# Patient Record
Sex: FEMALE | Race: WHITE | Employment: PART TIME | URBAN - METROPOLITAN AREA
[De-identification: names, ages, dates, MRNs, and addresses within clinical notes are randomized per-mention and may not be internally consistent; named-entity substitution may affect disease eponyms.]

---

## 2018-03-23 RX ORDER — NAPROXEN AND ESOMEPRAZOLE MAGNESIUM 500; 20 MG/1; MG/1
1 TABLET, DELAYED RELEASE ORAL 2 TIMES DAILY
Qty: 180 TABLET | Refills: 0 | Status: SHIPPED | OUTPATIENT
Start: 2018-03-23 | End: 2019-04-18 | Stop reason: SDUPTHER

## 2018-04-09 ENCOUNTER — HOSPITAL ENCOUNTER (OUTPATIENT)
Age: 43
Discharge: HOME | End: 2018-04-11
Payer: COMMERCIAL

## 2018-04-09 DIAGNOSIS — R53.83 FATIGUE, UNSPECIFIED TYPE: ICD-10-CM

## 2018-04-09 DIAGNOSIS — E55.9 VITAMIN D DEFICIENCY: ICD-10-CM

## 2018-04-09 DIAGNOSIS — E55.9 VITAMIN D DEFICIENCY: Primary | ICD-10-CM

## 2018-04-09 LAB
BASOPHILS ABSOLUTE: 0.02 E9/L (ref 0–0.2)
BASOPHILS RELATIVE PERCENT: 0.2 % (ref 0–2)
EOSINOPHILS ABSOLUTE: 0.09 E9/L (ref 0.05–0.5)
EOSINOPHILS RELATIVE PERCENT: 1.1 % (ref 0–6)
HCT VFR BLD CALC: 40.9 % (ref 34–48)
HEMOGLOBIN: 13.4 G/DL (ref 11.5–15.5)
IMMATURE GRANULOCYTES #: 0.02 E9/L
IMMATURE GRANULOCYTES %: 0.2 % (ref 0–5)
LYMPHOCYTES ABSOLUTE: 2.83 E9/L (ref 1.5–4)
LYMPHOCYTES RELATIVE PERCENT: 34.7 % (ref 20–42)
MCH RBC QN AUTO: 28.9 PG (ref 26–35)
MCHC RBC AUTO-ENTMCNC: 32.8 % (ref 32–34.5)
MCV RBC AUTO: 88.3 FL (ref 80–99.9)
MONOCYTES ABSOLUTE: 0.55 E9/L (ref 0.1–0.95)
MONOCYTES RELATIVE PERCENT: 6.7 % (ref 2–12)
NEUTROPHILS ABSOLUTE: 4.64 E9/L (ref 1.8–7.3)
NEUTROPHILS RELATIVE PERCENT: 57.1 % (ref 43–80)
PDW BLD-RTO: 12.2 FL (ref 11.5–15)
PLATELET # BLD: 217 E9/L (ref 130–450)
PMV BLD AUTO: 11.3 FL (ref 7–12)
RBC # BLD: 4.63 E12/L (ref 3.5–5.5)
T4 FREE: 1 NG/DL (ref 0.93–1.7)
TSH SERPL DL<=0.05 MIU/L-ACNC: 2.52 UIU/ML (ref 0.27–4.2)
VITAMIN D 25-HYDROXY: 38 NG/ML (ref 30–100)
WBC # BLD: 8.2 E9/L (ref 4.5–11.5)

## 2018-04-09 PROCEDURE — 85025 COMPLETE CBC W/AUTO DIFF WBC: CPT

## 2018-04-09 PROCEDURE — 84439 ASSAY OF FREE THYROXINE: CPT

## 2018-04-09 PROCEDURE — 82306 VITAMIN D 25 HYDROXY: CPT

## 2018-04-09 PROCEDURE — 84443 ASSAY THYROID STIM HORMONE: CPT

## 2018-04-13 ENCOUNTER — OFFICE VISIT (OUTPATIENT)
Dept: FAMILY MEDICINE CLINIC | Age: 43
End: 2018-04-13
Payer: COMMERCIAL

## 2018-04-13 VITALS
SYSTOLIC BLOOD PRESSURE: 122 MMHG | HEART RATE: 77 BPM | HEIGHT: 65 IN | WEIGHT: 157 LBS | DIASTOLIC BLOOD PRESSURE: 74 MMHG | OXYGEN SATURATION: 98 % | TEMPERATURE: 98.2 F | BODY MASS INDEX: 26.16 KG/M2 | RESPIRATION RATE: 14 BRPM

## 2018-04-13 DIAGNOSIS — Z13.31 POSITIVE DEPRESSION SCREENING: ICD-10-CM

## 2018-04-13 DIAGNOSIS — Z00.00 ROUTINE GENERAL MEDICAL EXAMINATION AT A HEALTH CARE FACILITY: Primary | ICD-10-CM

## 2018-04-13 DIAGNOSIS — F32.A DEPRESSION, UNSPECIFIED DEPRESSION TYPE: ICD-10-CM

## 2018-04-13 PROCEDURE — G8431 POS CLIN DEPRES SCRN F/U DOC: HCPCS | Performed by: NURSE PRACTITIONER

## 2018-04-13 PROCEDURE — G0444 DEPRESSION SCREEN ANNUAL: HCPCS | Performed by: NURSE PRACTITIONER

## 2018-04-13 PROCEDURE — 99396 PREV VISIT EST AGE 40-64: CPT | Performed by: NURSE PRACTITIONER

## 2018-04-13 RX ORDER — BUPROPION HYDROCHLORIDE 100 MG/1
100 TABLET, EXTENDED RELEASE ORAL 2 TIMES DAILY
Qty: 60 TABLET | Refills: 0 | Status: SHIPPED | OUTPATIENT
Start: 2018-04-13 | End: 2018-05-15 | Stop reason: DRUGHIGH

## 2018-04-13 RX ORDER — ATENOLOL 25 MG/1
12.5 TABLET ORAL DAILY
COMMUNITY
End: 2020-03-25 | Stop reason: SDUPTHER

## 2018-04-13 ASSESSMENT — PATIENT HEALTH QUESTIONNAIRE - PHQ9
2. FEELING DOWN, DEPRESSED OR HOPELESS: 2
8. MOVING OR SPEAKING SO SLOWLY THAT OTHER PEOPLE COULD HAVE NOTICED. OR THE OPPOSITE, BEING SO FIGETY OR RESTLESS THAT YOU HAVE BEEN MOVING AROUND A LOT MORE THAN USUAL: 0
9. THOUGHTS THAT YOU WOULD BE BETTER OFF DEAD, OR OF HURTING YOURSELF: 0
3. TROUBLE FALLING OR STAYING ASLEEP: 1
10. IF YOU CHECKED OFF ANY PROBLEMS, HOW DIFFICULT HAVE THESE PROBLEMS MADE IT FOR YOU TO DO YOUR WORK, TAKE CARE OF THINGS AT HOME, OR GET ALONG WITH OTHER PEOPLE: 1
5. POOR APPETITE OR OVEREATING: 0
1. LITTLE INTEREST OR PLEASURE IN DOING THINGS: 0
SUM OF ALL RESPONSES TO PHQ9 QUESTIONS 1 & 2: 2
7. TROUBLE CONCENTRATING ON THINGS, SUCH AS READING THE NEWSPAPER OR WATCHING TELEVISION: 3
4. FEELING TIRED OR HAVING LITTLE ENERGY: 3
6. FEELING BAD ABOUT YOURSELF - OR THAT YOU ARE A FAILURE OR HAVE LET YOURSELF OR YOUR FAMILY DOWN: 2
SUM OF ALL RESPONSES TO PHQ QUESTIONS 1-9: 11

## 2018-05-15 RX ORDER — BUPROPION HYDROCHLORIDE 150 MG/1
150 TABLET, EXTENDED RELEASE ORAL 2 TIMES DAILY
Qty: 60 TABLET | Refills: 3 | Status: SHIPPED | OUTPATIENT
Start: 2018-05-15 | End: 2018-09-11 | Stop reason: SDUPTHER

## 2018-07-11 RX ORDER — SCOLOPAMINE TRANSDERMAL SYSTEM 1 MG/1
1 PATCH, EXTENDED RELEASE TRANSDERMAL
Qty: 4 PATCH | Refills: 2 | Status: SHIPPED
Start: 2018-07-11 | End: 2021-10-29 | Stop reason: SDUPTHER

## 2018-10-25 DIAGNOSIS — G43.109 MIGRAINE WITH AURA AND WITHOUT STATUS MIGRAINOSUS, NOT INTRACTABLE: ICD-10-CM

## 2018-10-26 RX ORDER — BUTALBITAL, ACETAMINOPHEN AND CAFFEINE 50; 325; 40 MG/1; MG/1; MG/1
1 TABLET ORAL EVERY 4 HOURS PRN
Qty: 30 TABLET | Refills: 2 | Status: SHIPPED | OUTPATIENT
Start: 2018-10-26 | End: 2019-12-05 | Stop reason: SDUPTHER

## 2018-12-18 DIAGNOSIS — N30.01 ACUTE CYSTITIS WITH HEMATURIA: Primary | ICD-10-CM

## 2018-12-18 RX ORDER — CIPROFLOXACIN 500 MG/1
500 TABLET, FILM COATED ORAL 2 TIMES DAILY
Qty: 14 TABLET | Refills: 0 | Status: SHIPPED | OUTPATIENT
Start: 2018-12-18 | End: 2018-12-25

## 2019-04-02 RX ORDER — BUPROPION HYDROCHLORIDE 200 MG/1
200 TABLET, EXTENDED RELEASE ORAL 2 TIMES DAILY
Qty: 60 TABLET | Refills: 3 | Status: SHIPPED | OUTPATIENT
Start: 2019-04-02 | End: 2019-08-16 | Stop reason: SDUPTHER

## 2019-04-18 RX ORDER — NAPROXEN AND ESOMEPRAZOLE MAGNESIUM 500; 20 MG/1; MG/1
1 TABLET, DELAYED RELEASE ORAL 2 TIMES DAILY
Qty: 180 TABLET | Refills: 0 | Status: SHIPPED
Start: 2019-04-18 | End: 2021-10-29 | Stop reason: ALTCHOICE

## 2019-08-16 RX ORDER — BUPROPION HYDROCHLORIDE 200 MG/1
200 TABLET, EXTENDED RELEASE ORAL 2 TIMES DAILY
Qty: 60 TABLET | Refills: 3 | Status: SHIPPED | OUTPATIENT
Start: 2019-08-16 | End: 2019-12-07 | Stop reason: SDUPTHER

## 2019-11-13 RX ORDER — HALCINONIDE 1 MG/G
1 CREAM TOPICAL DAILY
Qty: 60 G | Refills: 5 | Status: SHIPPED
Start: 2019-11-13 | End: 2022-03-24 | Stop reason: SDUPTHER

## 2019-12-05 DIAGNOSIS — G43.109 MIGRAINE WITH AURA AND WITHOUT STATUS MIGRAINOSUS, NOT INTRACTABLE: ICD-10-CM

## 2019-12-05 RX ORDER — BUTALBITAL, ACETAMINOPHEN AND CAFFEINE 50; 325; 40 MG/1; MG/1; MG/1
1 TABLET ORAL EVERY 4 HOURS PRN
Qty: 30 TABLET | Refills: 2 | Status: SHIPPED
Start: 2019-12-05 | End: 2021-04-05

## 2019-12-05 RX ORDER — CYCLOBENZAPRINE HCL 5 MG
5 TABLET ORAL 2 TIMES DAILY PRN
Qty: 60 TABLET | Refills: 2 | Status: SHIPPED | OUTPATIENT
Start: 2019-12-05

## 2019-12-06 RX ORDER — CEPHALEXIN 500 MG/1
500 CAPSULE ORAL 3 TIMES DAILY
Qty: 30 CAPSULE | Refills: 0 | Status: SHIPPED | OUTPATIENT
Start: 2019-12-06 | End: 2019-12-16

## 2019-12-09 RX ORDER — BUPROPION HYDROCHLORIDE 200 MG/1
TABLET, EXTENDED RELEASE ORAL
Qty: 60 TABLET | Refills: 2 | Status: SHIPPED
Start: 2019-12-09 | End: 2020-05-04

## 2020-03-25 RX ORDER — ATENOLOL 25 MG/1
12.5 TABLET ORAL DAILY
Qty: 30 TABLET | Refills: 0 | Status: SHIPPED
Start: 2020-03-25 | End: 2020-06-08

## 2020-05-05 RX ORDER — BUPROPION HYDROCHLORIDE 200 MG/1
TABLET, EXTENDED RELEASE ORAL
Qty: 60 TABLET | Refills: 3 | Status: SHIPPED
Start: 2020-05-05 | End: 2020-09-08

## 2020-05-31 ENCOUNTER — HOSPITAL ENCOUNTER (OUTPATIENT)
Dept: GENERAL RADIOLOGY | Age: 45
End: 2020-05-31
Payer: COMMERCIAL

## 2020-05-31 ENCOUNTER — HOSPITAL ENCOUNTER (OUTPATIENT)
Dept: GENERAL RADIOLOGY | Age: 45
Discharge: HOME | End: 2020-06-02
Payer: COMMERCIAL

## 2020-05-31 ENCOUNTER — HOSPITAL ENCOUNTER (OUTPATIENT)
Age: 45
Discharge: HOME | End: 2020-06-02
Payer: COMMERCIAL

## 2020-05-31 PROCEDURE — 73630 X-RAY EXAM OF FOOT: CPT

## 2020-06-02 ENCOUNTER — OFFICE VISIT (OUTPATIENT)
Dept: ORTHOPEDIC SURGERY | Age: 45
End: 2020-06-02
Payer: COMMERCIAL

## 2020-06-02 VITALS — BODY MASS INDEX: 23.66 KG/M2 | HEIGHT: 65 IN | WEIGHT: 142 LBS

## 2020-06-02 PROCEDURE — 99203 OFFICE O/P NEW LOW 30 MIN: CPT | Performed by: ORTHOPAEDIC SURGERY

## 2020-06-02 PROCEDURE — 28470 CLTX METATARSAL FX WO MNP EA: CPT | Performed by: ORTHOPAEDIC SURGERY

## 2020-06-02 NOTE — PROGRESS NOTES
Chief Complaint   Patient presents with    Foot Pain     left foot fx on 5/30/2019. Twisted foot on tile stepping over dog gate. Jono Noland is a 50 y.o.   female who presents today  for evaluation of left foot pain. she reports this has been ongoing for the past 3 days. she does remember a specific injury that started the pain. She twisted foot on tile stepping over dog gate. she reports the pain is located over base of 5th metatarsal, and is worse with ambulation and better with rest.      Past Medical History:   Diagnosis Date    Eczema     Headache     Migraine     Motion sickness      Past Surgical History:   Procedure Laterality Date    LYMPH NODE BIOPSY         Current Outpatient Medications:     buPROPion (WELLBUTRIN SR) 200 MG extended release tablet, TAKE ONE TABLET BY MOUTH TWO TIMES A DAY, Disp: 60 tablet, Rfl: 3    atenolol (TENORMIN) 25 MG tablet, Take 0.5 tablets by mouth daily, Disp: 30 tablet, Rfl: 0    butalbital-acetaminophen-caffeine (FIORICET, ESGIC) -40 MG per tablet, Take 1 tablet by mouth every 4 hours as needed for Headaches or Migraine, Disp: 30 tablet, Rfl: 2    cyclobenzaprine (FLEXERIL) 5 MG tablet, Take 1 tablet by mouth 2 times daily as needed for Muscle spasms, Disp: 60 tablet, Rfl: 2    Crisaborole (EUCRISA) 2 % OINT, Apply 1 applicator topically 2 times daily, Disp: 60 g, Rfl: 5    halcinonide (HALOG) 0.1 % CREA, Apply 1 Tube topically daily, Disp: 60 g, Rfl: 5    VIMOVO 500-20 MG TBEC, Take 1 tablet by mouth 2 times daily, Disp: 180 tablet, Rfl: 0    scopolamine (TRANSDERM-SCOP) transdermal patch, Place 1 patch onto the skin every 72 hours, Disp: 4 patch, Rfl: 2    mupirocin (BACTROBAN) 2 % ointment, Apply topically 2 times daily. , Disp: 1 Tube, Rfl: 0    EPINEPHrine (EPIPEN 2-HENRRY) 0.3 MG/0.3ML SOAJ injection, Inject 0.3 mLs into the muscle once for 1 dose Use as directed for allergic reaction, Disp: 0.3 mL, Rfl: 2  Allergies   Allergen

## 2020-06-08 ENCOUNTER — OFFICE VISIT (OUTPATIENT)
Dept: ORTHOPEDIC SURGERY | Age: 45
End: 2020-06-08

## 2020-06-08 VITALS — BODY MASS INDEX: 23.66 KG/M2 | HEIGHT: 65 IN | WEIGHT: 142 LBS

## 2020-06-08 PROCEDURE — 99024 POSTOP FOLLOW-UP VISIT: CPT | Performed by: ORTHOPAEDIC SURGERY

## 2020-06-08 RX ORDER — ATENOLOL 25 MG/1
TABLET ORAL
Qty: 30 TABLET | Refills: 2 | Status: SHIPPED
Start: 2020-06-08 | End: 2020-12-14 | Stop reason: SDUPTHER

## 2020-06-08 NOTE — PROGRESS NOTES
antalgic; examination of the nails and digits reveal no cyanosis or clubbing. Ankle Exam:    Upon inspection and palpation of the Left ankle,  there is not deformity noted,  no swelling, no ecchymosis, does not have pain on palpation of ankle. ROM R/L : DF 15/15; PF 35/35;  INV 15/15, FER 15/15. This exam was compared bilaterally. Right Ankle:   (-) Anterior Drawer ,  (-) Posterior Drawer ,  (-) Squeeze test,  (-) External Rotation, (-) Eversion test , (-) Lebron Test     Left ankle:   (-) Anterior Drawer ,(-)  Posterior Drawer ,(-) Squeeze test,(-) External Rotation (-) Eversion test, (-) Lebron Test.      Foot exam- visual inspection reveals warm, good capillary refill, there is  pain to palpation over the base of 5th metatarsal.   ROM inversion/eversion diminished range of motion, abduction/adduction diminished range of motion, ROM in MTP/PIP/DIP diminished range of motion. Xrays:  Stable transverse fracture proximal 5th metatarsal  Radiographic findings reviewed with patient    Impression:  Encounter Diagnosis   Name Primary?  Closed nondisplaced fracture of fifth metatarsal bone of left foot, initial encounter Yes     Plan:Natural history and expected course discussed. Questions answered. Rest, ice, compression, elevation (RICE) therapy. Crutches and instructions provided. Educational materials distributed.    Protected weight bearing  CT to determine fracture zone  And future weight bearing status- will call patient with results  Crutches  Cam walker boot, ok for ROM  Fu in 4 weeks with xr

## 2020-07-08 ENCOUNTER — OFFICE VISIT (OUTPATIENT)
Dept: ORTHOPEDIC SURGERY | Age: 45
End: 2020-07-08

## 2020-07-08 VITALS — BODY MASS INDEX: 23.65 KG/M2 | WEIGHT: 141.98 LBS | HEIGHT: 65 IN

## 2020-07-08 PROCEDURE — 99024 POSTOP FOLLOW-UP VISIT: CPT | Performed by: NURSE PRACTITIONER

## 2020-09-08 RX ORDER — BUPROPION HYDROCHLORIDE 200 MG/1
TABLET, EXTENDED RELEASE ORAL
Qty: 60 TABLET | Refills: 3 | Status: SHIPPED
Start: 2020-09-08 | End: 2021-02-09

## 2020-09-14 ENCOUNTER — OFFICE VISIT (OUTPATIENT)
Dept: ORTHOPEDIC SURGERY | Age: 45
End: 2020-09-14
Payer: COMMERCIAL

## 2020-09-14 VITALS — TEMPERATURE: 98 F | BODY MASS INDEX: 23.32 KG/M2 | WEIGHT: 140 LBS | HEIGHT: 65 IN

## 2020-09-14 PROCEDURE — 99212 OFFICE O/P EST SF 10 MIN: CPT | Performed by: NURSE PRACTITIONER

## 2020-09-14 NOTE — PROGRESS NOTES
Chief Complaint   Patient presents with    Foot Pain     Left Foot FX, F/U DOI 5/30/2020       Susanne Skiff is a 50 y.o.   female who presents today  for evaluation of left foot fracture. She is back to normal activity and wbat in regular shoe. Past Medical History:   Diagnosis Date    Eczema     Headache     Migraine     Motion sickness      Past Surgical History:   Procedure Laterality Date    LYMPH NODE BIOPSY         Current Outpatient Medications:     buPROPion (WELLBUTRIN SR) 200 MG extended release tablet, TAKE ONE TABLET BY MOUTH TWO TIMES A DAY, Disp: 60 tablet, Rfl: 3    atenolol (TENORMIN) 25 MG tablet, TAKE ONE-HALF TABLET BY MOUTH DAILY, Disp: 30 tablet, Rfl: 2    butalbital-acetaminophen-caffeine (FIORICET, ESGIC) -40 MG per tablet, Take 1 tablet by mouth every 4 hours as needed for Headaches or Migraine, Disp: 30 tablet, Rfl: 2    cyclobenzaprine (FLEXERIL) 5 MG tablet, Take 1 tablet by mouth 2 times daily as needed for Muscle spasms, Disp: 60 tablet, Rfl: 2    Crisaborole (EUCRISA) 2 % OINT, Apply 1 applicator topically 2 times daily, Disp: 60 g, Rfl: 5    halcinonide (HALOG) 0.1 % CREA, Apply 1 Tube topically daily, Disp: 60 g, Rfl: 5    VIMOVO 500-20 MG TBEC, Take 1 tablet by mouth 2 times daily, Disp: 180 tablet, Rfl: 0    scopolamine (TRANSDERM-SCOP) transdermal patch, Place 1 patch onto the skin every 72 hours, Disp: 4 patch, Rfl: 2    mupirocin (BACTROBAN) 2 % ointment, Apply topically 2 times daily. , Disp: 1 Tube, Rfl: 0    EPINEPHrine (EPIPEN 2-HENRRY) 0.3 MG/0.3ML SOAJ injection, Inject 0.3 mLs into the muscle once for 1 dose Use as directed for allergic reaction, Disp: 0.3 mL, Rfl: 2  Allergies   Allergen Reactions    Bactrim [Sulfamethoxazole-Trimethoprim] Hives    Bee Venom Anaphylaxis    Sulfa Antibiotics Hives     Social History     Socioeconomic History    Marital status:      Spouse name: Not on file    Number of children: Not on file    sweats. GI: (-) nausea, (-) vomiting, (-) diarrhea, (-) blood in stool, (-) gastric ulcer. Psychiatric: (-) Depression, (-) Anxiety, (-) bipolar disease, (-) Alzheimer's Disease  Allergic/Immunologic: (-) allergies latex, (-) allergies metal, (-) skin sensitivity. Hematlogic: (-) anemia, (-) blood transfusion, (-) DVT/PE, (-) Clotting disorders      EXAM:      Constitution:    Temp 98 °F (36.7 °C)   Ht 5' 5\" (1.651 m)   Wt 140 lb (63.5 kg)   BMI 23.30 kg/m²     Psycihatric:    The patient is alert and oriented x 3, appears to be stated age and in no distress. Respiratory:    Respiratory effort is not labored. Patient is not gasping. Palpation of the chest reveals no tactile fremitus. Skin:    Upon inspection: the skin appears warm, dry and intact. There is not a previous scar over the affected area. There is not any cellulitis, lymphedema or cutaneous lesions noted in the lower extremities. Upon palpation there is no induration noted. Neurologic:      Motor exam of the lower extremities show ; quadriceps, hamstrings, foot dorsi and plantar flexors intact R.  5/5 and L. 5/5. Deep tendon reflexes are 2/4 at the knees and 2/4 at the ankles with strong extensor hallicus longus motor strength bilaterally. Sensory to both feet is intact to all sensory roots. Cardiovascular: The vascular exam is normal and is well perfused to distal extremities. Distal pulses DP/PT: R. 2+; L. 2+. There is cap refill noted less than two seconds in all digits. There is not edema of the bilateral lower extremities. There is not varicosities noted in the distal extremities. Lymph:    Upon palpation,  there is no lymphadenopathy noted in bilateral lower extremities. Musculoskeletal:    Gait: antalgic; examination of the nails and digits reveal no cyanosis or clubbing.         Ankle Exam:    Upon inspection and palpation of the Left ankle,  there is not deformity noted,  no swelling, no ecchymosis,

## 2020-09-24 ENCOUNTER — OFFICE VISIT (OUTPATIENT)
Dept: ORTHOPEDIC SURGERY | Age: 45
End: 2020-09-24
Payer: OTHER GOVERNMENT

## 2020-09-24 VITALS — BODY MASS INDEX: 23.32 KG/M2 | WEIGHT: 140 LBS | HEIGHT: 65 IN

## 2020-09-24 PROCEDURE — 99213 OFFICE O/P EST LOW 20 MIN: CPT | Performed by: ORTHOPAEDIC SURGERY

## 2020-09-24 NOTE — PROGRESS NOTES
Chief Complaint   Patient presents with    Foot Pain     Right foot injury 9/18/2020. Patient was stepped on by her horse. Patient wearing cast boot and limited weight bearing with crutches. Syed Luciano is a 50 y.o. female who presents today with a right foot injury. The injury occurred 9/18/2020. The history of injury was from direct trauma when her horse was spooked and essentially struck her foot with his hoof. The patient complains of pain over lateral mid foot. The intensity is 2/10. The pain is described as: dull. Previous treatment includes:ice  elevation  Short cast boot with good relief. Past Medical History:   Diagnosis Date    Eczema     Headache     Migraine     Motion sickness      Past Surgical History:   Procedure Laterality Date    LYMPH NODE BIOPSY         Current Outpatient Medications:     buPROPion (WELLBUTRIN SR) 200 MG extended release tablet, TAKE ONE TABLET BY MOUTH TWO TIMES A DAY, Disp: 60 tablet, Rfl: 3    atenolol (TENORMIN) 25 MG tablet, TAKE ONE-HALF TABLET BY MOUTH DAILY, Disp: 30 tablet, Rfl: 2    butalbital-acetaminophen-caffeine (FIORICET, ESGIC) -40 MG per tablet, Take 1 tablet by mouth every 4 hours as needed for Headaches or Migraine, Disp: 30 tablet, Rfl: 2    cyclobenzaprine (FLEXERIL) 5 MG tablet, Take 1 tablet by mouth 2 times daily as needed for Muscle spasms, Disp: 60 tablet, Rfl: 2    Crisaborole (EUCRISA) 2 % OINT, Apply 1 applicator topically 2 times daily, Disp: 60 g, Rfl: 5    halcinonide (HALOG) 0.1 % CREA, Apply 1 Tube topically daily, Disp: 60 g, Rfl: 5    VIMOVO 500-20 MG TBEC, Take 1 tablet by mouth 2 times daily, Disp: 180 tablet, Rfl: 0    scopolamine (TRANSDERM-SCOP) transdermal patch, Place 1 patch onto the skin every 72 hours, Disp: 4 patch, Rfl: 2    mupirocin (BACTROBAN) 2 % ointment, Apply topically 2 times daily. , Disp: 1 Tube, Rfl: 0    EPINEPHrine (EPIPEN 2-HENRRY) 0.3 MG/0.3ML SOAJ injection, Inject 0.3 mLs into the muscle once for 1 dose Use as directed for allergic reaction, Disp: 0.3 mL, Rfl: 2  Allergies   Allergen Reactions    Bactrim [Sulfamethoxazole-Trimethoprim] Hives    Bee Venom Anaphylaxis    Sulfa Antibiotics Hives     Social History     Socioeconomic History    Marital status:      Spouse name: Not on file    Number of children: Not on file    Years of education: Not on file    Highest education level: Not on file   Occupational History    Not on file   Social Needs    Financial resource strain: Not on file    Food insecurity     Worry: Not on file     Inability: Not on file    Transportation needs     Medical: Not on file     Non-medical: Not on file   Tobacco Use    Smoking status: Never Smoker    Smokeless tobacco: Never Used   Substance and Sexual Activity    Alcohol use: Yes     Comment: rarely    Drug use: No    Sexual activity: Not on file   Lifestyle    Physical activity     Days per week: Not on file     Minutes per session: Not on file    Stress: Not on file   Relationships    Social connections     Talks on phone: Not on file     Gets together: Not on file     Attends Sikh service: Not on file     Active member of club or organization: Not on file     Attends meetings of clubs or organizations: Not on file     Relationship status: Not on file    Intimate partner violence     Fear of current or ex partner: Not on file     Emotionally abused: Not on file     Physically abused: Not on file     Forced sexual activity: Not on file   Other Topics Concern    Not on file   Social History Narrative    Not on file     Family History   Problem Relation Age of Onset    Cancer Father        REVIEW OF SYSTEMS:     General/Constitution:  (-)weight loss, (-)fever, (-)chills, (-)weakness. Skin: (-) rash,(-) psoriasis,(-) eczema, (-)skin cancer.    Musculoskeletal: (-) fractures,  (-) dislocations,(-) collagen vascular disease, (-) fibromyalgia, (-) multiple sclerosis, (-) muscular dystrophy, (-) RSD,(-) joint pain (-)swelling, (-) joint pain,swelling. Neurologic: (-) epilepsy, (-)seizures,(-) brain tumor,(-) TIA, (-)stroke, (-)headaches, (-)Parkinson disease,(-) memory loss, (-) LOC. Cardiovascular: (-) Chest pain, (-) swelling in legs/feet, (-) SOB, (-) cramping in legs/feet with walking. Respiratory: (-) SOB, (-) Coughing, (-) night sweats. GI: (-) nausea, (-) vomiting, (-) diarrhea, (-) blood in stool, (-) gastric ulcer. Psychiatric: (-) Depression, (-) Anxiety, (-) bipolar disease, (-) Alzheimer's Disease  Allergic/Immunologic: (-) allergies latex, (-) allergies metal, (-) skin sensitivity. Hematlogic: (-) anemia, (-) blood transfusion, (-) DVT/PE, (-) Clotting disorders      EXAM:      Constitution:  There were no vitals filed for this visit. Psycihatric:    The patient is alert and oriented x 3, appears to be stated age and in no distress. Respiratory:    Respiratory effort is not labored. Patient is not gasping. Palpation of the chest reveals no tactile fremitus. Skin:    Upon inspection: the skin appears warm, dry and intact. There is not a previous scar over the affected area. There is not any cellulitis, lymphedema or cutaneous lesions noted in the lower extremities. Upon palpation there is no induration noted. Neurologic:      Motor exam of the lower extremities show ; quadriceps, hamstrings, foot dorsi and plantar flexors intact R.  5/5 and L. 5/5. Deep tendon reflexes are 2/4 at the knees and 2/4 at the ankles with strong extensor hallicus longus motor strength bilaterally. Sensory to both feet is intact to all sensory roots. Cardiovascular: The vascular exam is normal and is well perfused to distal extremities. Distal pulses DP/PT: R. 2+; L. 2+. There is cap refill noted less than two seconds in all digits. There is not edema of the bilateral lower extremities. There is not varicosities noted in the distal extremities.       Lymph:    Upon palpation,  there is no lymphadenopathy noted in bilateral lower extremities. Musculoskeletal:    Gait: antalgic; examination of the nails and digits reveal no cyanosis or clubbing. Knee exam - bilateral knee exam shows;  range of motion of R. Knee is 0 to 140, and L. Knee is 0 to 140. The patient does not have  pain on motion, there is not an effusion, there is not tenderness over the  global region, there are not any masses, there is not ligamentous instability, there is not  deformity noted. Knee exam: the injured knee reveals normal exam, no swelling, tenderness, instability; ligaments intact, FROM. Ankle Exam:    Upon inspection and palpation of the Right ankle,  there is not deformity noted,  moderate swelling, moderate ecchymosis, has pain on palpation of right lateral midfoot. ROM R: Limited Secondary to pain; Left ankle : DF20; PF 40;  INV 30, FER 10. This exam was compared bilaterally. Right Ankle:   (-) Anterior Drawer ,  (-) Posterior Drawer ,  (-) Squeeze test,  (-) External Rotation, (-) Eversion test , (-) Lebron Test     Left ankle:   (-) Anterior Drawer ,(-)  Posterior Drawer ,(-) Squeeze test,(-) External Rotation (-) Eversion test, (-) Lebron Test.      Foot exam- visual inspection reveals warm, good capillary refill, there is not pain to palpation over the metatarsals. ROM inversion/eversion full range of motion, abduction/adduction full range of motion, ROM in MTP/PIP/DIP full range of motion. Xrays:    Xr Foot Left (min 3 Views)    Result Date: 9/14/2020  EXAMINATION: THREE XRAY VIEWS OF THE LEFT FOOT 9/14/2020 9:23 am COMPARISON: 07/08/2020 HISTORY: ORDERING SYSTEM PROVIDED HISTORY: Closed nondisplaced fracture of fifth metatarsal bone of left foot, initial encounter FINDINGS: There has been continued interval healing of the fracture through the base of the 5th metatarsal.  A vague fracture lucency is persistent. Anatomic alignment is maintained.   The 1st through 4th digits are unremarkable. 1. Continued interval healing of the fracture through the base of the 5th metatarsal.     Xr Foot Right (min 3 Views)    Result Date: 9/23/2020  EXAMINATION: THREE XRAY VIEWS OF THE RIGHT FOOT 9/23/2020 1:04 pm COMPARISON: None. HISTORY: ORDERING SYSTEM PROVIDED HISTORY: Right foot pain TECHNOLOGIST PROVIDED HISTORY: Reason for exam:->pain after injury Trauma 5 days ago, pain 5th digit FINDINGS: There is a tiny bony density adjacent to the plantar lateral aspect of the base of the cuboid bone, consistent with a fracture. Its age is indeterminate. Otherwise, no acute fracture or dislocation is seen. There is mild degenerative change about the 1st MTP joint with a tiny bony density seen medially that could be related to a loose body or old fracture. There is a tiny plantar calcaneal spur. No erosive changes are seen and the visualized soft tissues are unremarkable. 1. Tiny fracture at the plantar lateral aspect of the base of the cuboid bone, of indeterminate age. Clinical correlation is suggested. 2. Mild degenerative change at the 1st MTP joint. Tiny plantar calcaneal spur. Radiographic findings reviewed with patient      Impression:  No Christian was seen today for foot pain. Diagnoses and all orders for this visit:    Closed nondisplaced fracture of cuboid of right foot, initial encounter  -     CT FOOT RIGHT WO CONTRAST; Future        Patient seen and examined. X-rays reviewed. Natural history and course discussed with patient. Treatment options discussed with patient in detail including risks and benefits. Patient should do well with conservative management at this time. Plan:Natural history and expected course discussed. Questions answered. Rest, ice, compression, elevation (RICE) therapy. Crutches and instructions provided. Educational materials distributed. Fit with short leg cast boot for use over next 2-4 weeks.   Home exercise plan outlined. OTC analgesics as needed.   PT referral.  F/u prn if no better in 4-6 weeks

## 2020-10-07 ENCOUNTER — OFFICE VISIT (OUTPATIENT)
Dept: ORTHOPEDIC SURGERY | Age: 45
End: 2020-10-07
Payer: OTHER GOVERNMENT

## 2020-10-07 VITALS — BODY MASS INDEX: 23.32 KG/M2 | WEIGHT: 140 LBS | HEIGHT: 65 IN

## 2020-10-07 PROCEDURE — 99212 OFFICE O/P EST SF 10 MIN: CPT | Performed by: ORTHOPAEDIC SURGERY

## 2020-10-07 NOTE — PROGRESS NOTES
Chief Complaint   Patient presents with    Foot Pain     Right foot fx fu. DOI 9/18/2020. Deanna Durán is a 50 y.o. female who presents today with a right foot injury. The injury occurred 9/18/2020. The history of injury was from direct trauma when her horse was spooked and essentially struck her foot with his hoof. The patient complains of pain over lateral mid foot. The intensity is 2/10. The pain is described as: dull. Previous treatment includes:ice  elevation  Short cast boot with good relief. Past Medical History:   Diagnosis Date    Eczema     Headache     Migraine     Motion sickness      Past Surgical History:   Procedure Laterality Date    LYMPH NODE BIOPSY         Current Outpatient Medications:     buPROPion (WELLBUTRIN SR) 200 MG extended release tablet, TAKE ONE TABLET BY MOUTH TWO TIMES A DAY, Disp: 60 tablet, Rfl: 3    atenolol (TENORMIN) 25 MG tablet, TAKE ONE-HALF TABLET BY MOUTH DAILY, Disp: 30 tablet, Rfl: 2    butalbital-acetaminophen-caffeine (FIORICET, ESGIC) -40 MG per tablet, Take 1 tablet by mouth every 4 hours as needed for Headaches or Migraine, Disp: 30 tablet, Rfl: 2    cyclobenzaprine (FLEXERIL) 5 MG tablet, Take 1 tablet by mouth 2 times daily as needed for Muscle spasms, Disp: 60 tablet, Rfl: 2    Crisaborole (EUCRISA) 2 % OINT, Apply 1 applicator topically 2 times daily, Disp: 60 g, Rfl: 5    halcinonide (HALOG) 0.1 % CREA, Apply 1 Tube topically daily, Disp: 60 g, Rfl: 5    VIMOVO 500-20 MG TBEC, Take 1 tablet by mouth 2 times daily, Disp: 180 tablet, Rfl: 0    scopolamine (TRANSDERM-SCOP) transdermal patch, Place 1 patch onto the skin every 72 hours, Disp: 4 patch, Rfl: 2    mupirocin (BACTROBAN) 2 % ointment, Apply topically 2 times daily. , Disp: 1 Tube, Rfl: 0    EPINEPHrine (EPIPEN 2-HENRRY) 0.3 MG/0.3ML SOAJ injection, Inject 0.3 mLs into the muscle once for 1 dose Use as directed for allergic reaction, Disp: 0.3 mL, Rfl: 2  Allergies Allergen Reactions    Bactrim [Sulfamethoxazole-Trimethoprim] Hives    Bee Venom Anaphylaxis    Sulfa Antibiotics Hives     Social History     Socioeconomic History    Marital status:      Spouse name: Not on file    Number of children: Not on file    Years of education: Not on file    Highest education level: Not on file   Occupational History    Not on file   Social Needs    Financial resource strain: Not on file    Food insecurity     Worry: Not on file     Inability: Not on file    Transportation needs     Medical: Not on file     Non-medical: Not on file   Tobacco Use    Smoking status: Never Smoker    Smokeless tobacco: Never Used   Substance and Sexual Activity    Alcohol use: Yes     Comment: rarely    Drug use: No    Sexual activity: Not on file   Lifestyle    Physical activity     Days per week: Not on file     Minutes per session: Not on file    Stress: Not on file   Relationships    Social connections     Talks on phone: Not on file     Gets together: Not on file     Attends Mu-ism service: Not on file     Active member of club or organization: Not on file     Attends meetings of clubs or organizations: Not on file     Relationship status: Not on file    Intimate partner violence     Fear of current or ex partner: Not on file     Emotionally abused: Not on file     Physically abused: Not on file     Forced sexual activity: Not on file   Other Topics Concern    Not on file   Social History Narrative    Not on file     Family History   Problem Relation Age of Onset    Cancer Father        REVIEW OF SYSTEMS:     General/Constitution:  (-)weight loss, (-)fever, (-)chills, (-)weakness. Skin: (-) rash,(-) psoriasis,(-) eczema, (-)skin cancer. Musculoskeletal: (-) fractures,  (-) dislocations,(-) collagen vascular disease, (-) fibromyalgia, (-) multiple sclerosis, (-) muscular dystrophy, (-) RSD,(-) joint pain (-)swelling, (-) joint pain,swelling.   Neurologic: (-) epilepsy, (-)seizures,(-) brain tumor,(-) TIA, (-)stroke, (-)headaches, (-)Parkinson disease,(-) memory loss, (-) LOC. Cardiovascular: (-) Chest pain, (-) swelling in legs/feet, (-) SOB, (-) cramping in legs/feet with walking. Respiratory: (-) SOB, (-) Coughing, (-) night sweats. GI: (-) nausea, (-) vomiting, (-) diarrhea, (-) blood in stool, (-) gastric ulcer. Psychiatric: (-) Depression, (-) Anxiety, (-) bipolar disease, (-) Alzheimer's Disease  Allergic/Immunologic: (-) allergies latex, (-) allergies metal, (-) skin sensitivity. Hematlogic: (-) anemia, (-) blood transfusion, (-) DVT/PE, (-) Clotting disorders      EXAM:      Constitution:  There were no vitals filed for this visit. Psycihatric:    The patient is alert and oriented x 3, appears to be stated age and in no distress. Respiratory:    Respiratory effort is not labored. Patient is not gasping. Palpation of the chest reveals no tactile fremitus. Skin:    Upon inspection: the skin appears warm, dry and intact. There is not a previous scar over the affected area. There is not any cellulitis, lymphedema or cutaneous lesions noted in the lower extremities. Upon palpation there is no induration noted. Neurologic:      Motor exam of the lower extremities show ; quadriceps, hamstrings, foot dorsi and plantar flexors intact R.  5/5 and L. 5/5. Deep tendon reflexes are 2/4 at the knees and 2/4 at the ankles with strong extensor hallicus longus motor strength bilaterally. Sensory to both feet is intact to all sensory roots. Cardiovascular: The vascular exam is normal and is well perfused to distal extremities. Distal pulses DP/PT: R. 2+; L. 2+. There is cap refill noted less than two seconds in all digits. There is not edema of the bilateral lower extremities. There is not varicosities noted in the distal extremities. Lymph:    Upon palpation,  there is no lymphadenopathy noted in bilateral lower extremities. Musculoskeletal:    Gait: antalgic; examination of the nails and digits reveal no cyanosis or clubbing. Knee exam - bilateral knee exam shows;  range of motion of R. Knee is 0 to 140, and L. Knee is 0 to 140. The patient does not have  pain on motion, there is not an effusion, there is not tenderness over the  global region, there are not any masses, there is not ligamentous instability, there is not  deformity noted. Knee exam: the injured knee reveals normal exam, no swelling, tenderness, instability; ligaments intact, FROM. Ankle Exam:    Upon inspection and palpation of the Right ankle,  there is not deformity noted,  moderate swelling, moderate ecchymosis, has pain on palpation of right lateral midfoot. ROM R: Limited Secondary to pain; Left ankle : DF20; PF 40;  INV 30, FER 10. This exam was compared bilaterally. Right Ankle:   (-) Anterior Drawer ,  (-) Posterior Drawer ,  (-) Squeeze test,  (-) External Rotation, (-) Eversion test , (-) Lebron Test     Left ankle:   (-) Anterior Drawer ,(-)  Posterior Drawer ,(-) Squeeze test,(-) External Rotation (-) Eversion test, (-) Lebron Test.      Foot exam- visual inspection reveals warm, good capillary refill, there is not pain to palpation over the metatarsals. ROM inversion/eversion full range of motion, abduction/adduction full range of motion, ROM in MTP/PIP/DIP full range of motion. Xrays:    Xr Foot Left (min 3 Views)    Result Date: 9/14/2020  EXAMINATION: THREE XRAY VIEWS OF THE LEFT FOOT 9/14/2020 9:23 am COMPARISON: 07/08/2020 HISTORY: ORDERING SYSTEM PROVIDED HISTORY: Closed nondisplaced fracture of fifth metatarsal bone of left foot, initial encounter FINDINGS: There has been continued interval healing of the fracture through the base of the 5th metatarsal.  A vague fracture lucency is persistent. Anatomic alignment is maintained. The 1st through 4th digits are unremarkable.      1. Continued interval healing of the cuboid of right foot, initial encounter        Patient seen and examined. X-rays reviewed. Natural history and course discussed with patient. Treatment options discussed with patient in detail including risks and benefits. Patient should do well with conservative management at this time. Plan:Natural history and expected course discussed. Questions answered. Rest, ice, compression, elevation (RICE) therapy. Crutches and instructions provided. Educational materials distributed. Fit with short leg cast boot for use over next 2-4 weeks. Home exercise plan outlined. OTC analgesics as needed.   PT referral.  F/u prn if no better in 4-6 weeks

## 2020-12-14 RX ORDER — ATENOLOL 25 MG/1
TABLET ORAL
Qty: 30 TABLET | Refills: 2 | Status: SHIPPED
Start: 2020-12-14 | End: 2021-06-07

## 2021-02-09 RX ORDER — BUPROPION HYDROCHLORIDE 200 MG/1
TABLET, EXTENDED RELEASE ORAL
Qty: 60 TABLET | Refills: 0 | Status: SHIPPED
Start: 2021-02-09 | End: 2021-04-05

## 2021-04-02 DIAGNOSIS — G43.109 MIGRAINE WITH AURA AND WITHOUT STATUS MIGRAINOSUS, NOT INTRACTABLE: ICD-10-CM

## 2021-04-05 RX ORDER — BUPROPION HYDROCHLORIDE 200 MG/1
TABLET, EXTENDED RELEASE ORAL
Qty: 60 TABLET | Refills: 0 | Status: SHIPPED
Start: 2021-04-05 | End: 2021-06-07

## 2021-04-05 RX ORDER — BUTALBITAL, ACETAMINOPHEN AND CAFFEINE 50; 325; 40 MG/1; MG/1; MG/1
TABLET ORAL
Qty: 30 TABLET | Refills: 0 | Status: SHIPPED
Start: 2021-04-05 | End: 2021-07-09 | Stop reason: SDUPTHER

## 2021-06-07 RX ORDER — BUPROPION HYDROCHLORIDE 200 MG/1
TABLET, EXTENDED RELEASE ORAL
Qty: 60 TABLET | Refills: 0 | Status: SHIPPED
Start: 2021-06-07 | End: 2021-07-14

## 2021-06-07 RX ORDER — ATENOLOL 25 MG/1
TABLET ORAL
Qty: 30 TABLET | Refills: 0 | Status: SHIPPED
Start: 2021-06-07 | End: 2021-09-01

## 2021-07-09 DIAGNOSIS — G43.109 MIGRAINE WITH AURA AND WITHOUT STATUS MIGRAINOSUS, NOT INTRACTABLE: ICD-10-CM

## 2021-07-09 RX ORDER — BUTALBITAL, ACETAMINOPHEN AND CAFFEINE 50; 325; 40 MG/1; MG/1; MG/1
TABLET ORAL
Qty: 30 TABLET | Refills: 0 | Status: SHIPPED
Start: 2021-07-09 | End: 2022-02-17 | Stop reason: SDUPTHER

## 2021-07-14 RX ORDER — BUPROPION HYDROCHLORIDE 200 MG/1
TABLET, EXTENDED RELEASE ORAL
Qty: 60 TABLET | Refills: 0 | Status: SHIPPED
Start: 2021-07-14 | End: 2021-09-01

## 2021-08-06 DIAGNOSIS — Z20.822 CLOSE EXPOSURE TO COVID-19 VIRUS: Primary | ICD-10-CM

## 2021-09-01 RX ORDER — ATENOLOL 25 MG/1
TABLET ORAL
Qty: 30 TABLET | Refills: 0 | Status: SHIPPED
Start: 2021-09-01 | End: 2021-10-29 | Stop reason: SDUPTHER

## 2021-09-01 RX ORDER — BUPROPION HYDROCHLORIDE 200 MG/1
TABLET, EXTENDED RELEASE ORAL
Qty: 60 TABLET | Refills: 0 | Status: SHIPPED
Start: 2021-09-01 | End: 2021-10-25

## 2021-09-14 DIAGNOSIS — Z20.822 CLOSE EXPOSURE TO COVID-19 VIRUS: Primary | ICD-10-CM

## 2021-09-16 LAB
SARS-COV-2: NOT DETECTED
SOURCE: NORMAL

## 2021-10-25 RX ORDER — BUPROPION HYDROCHLORIDE 200 MG/1
TABLET, EXTENDED RELEASE ORAL
Qty: 60 TABLET | Refills: 0 | Status: SHIPPED
Start: 2021-10-25 | End: 2021-10-29 | Stop reason: SDUPTHER

## 2021-10-26 ENCOUNTER — IMMUNIZATION (OUTPATIENT)
Dept: PRIMARY CARE CLINIC | Age: 46
End: 2021-10-26
Payer: COMMERCIAL

## 2021-10-26 PROCEDURE — 91301 COVID-19, MODERNA VACCINE 100MCG/0.5ML DOSE: CPT | Performed by: NURSE PRACTITIONER

## 2021-10-26 PROCEDURE — 0013A COVID-19, MODERNA VACCINE 100MCG/0.5ML DOSE: CPT | Performed by: NURSE PRACTITIONER

## 2021-10-29 ENCOUNTER — TELEPHONE (OUTPATIENT)
Dept: FAMILY MEDICINE CLINIC | Age: 46
End: 2021-10-29

## 2021-10-29 ENCOUNTER — OFFICE VISIT (OUTPATIENT)
Dept: FAMILY MEDICINE CLINIC | Age: 46
End: 2021-10-29
Payer: COMMERCIAL

## 2021-10-29 VITALS
DIASTOLIC BLOOD PRESSURE: 66 MMHG | SYSTOLIC BLOOD PRESSURE: 112 MMHG | OXYGEN SATURATION: 98 % | HEART RATE: 85 BPM | TEMPERATURE: 97.3 F | RESPIRATION RATE: 16 BRPM | WEIGHT: 150.6 LBS | BODY MASS INDEX: 25.09 KG/M2 | HEIGHT: 65 IN

## 2021-10-29 DIAGNOSIS — Z87.898 H/O MOTION SICKNESS: ICD-10-CM

## 2021-10-29 DIAGNOSIS — F41.9 ANXIETY AND DEPRESSION: ICD-10-CM

## 2021-10-29 DIAGNOSIS — Z00.00 ENCOUNTER FOR WELL ADULT EXAM WITHOUT ABNORMAL FINDINGS: Primary | ICD-10-CM

## 2021-10-29 DIAGNOSIS — R00.2 PALPITATIONS: ICD-10-CM

## 2021-10-29 DIAGNOSIS — F32.A ANXIETY AND DEPRESSION: ICD-10-CM

## 2021-10-29 PROCEDURE — 99396 PREV VISIT EST AGE 40-64: CPT | Performed by: NURSE PRACTITIONER

## 2021-10-29 RX ORDER — ATENOLOL 25 MG/1
12.5 TABLET ORAL DAILY
Qty: 45 TABLET | Refills: 0 | Status: SHIPPED
Start: 2021-10-29 | End: 2022-03-24 | Stop reason: SDUPTHER

## 2021-10-29 RX ORDER — BUPROPION HYDROCHLORIDE 200 MG/1
TABLET, EXTENDED RELEASE ORAL
Qty: 180 TABLET | Refills: 0 | Status: SHIPPED
Start: 2021-10-29 | End: 2022-04-07

## 2021-10-29 RX ORDER — ATENOLOL 25 MG/1
12.5 TABLET ORAL DAILY
Qty: 45 TABLET | Refills: 0 | Status: SHIPPED
Start: 2021-10-29 | End: 2021-10-29 | Stop reason: SDUPTHER

## 2021-10-29 RX ORDER — SCOLOPAMINE TRANSDERMAL SYSTEM 1 MG/1
1 PATCH, EXTENDED RELEASE TRANSDERMAL
Qty: 4 PATCH | Refills: 2 | Status: SHIPPED | OUTPATIENT
Start: 2021-10-29

## 2021-10-29 SDOH — ECONOMIC STABILITY: FOOD INSECURITY: WITHIN THE PAST 12 MONTHS, YOU WORRIED THAT YOUR FOOD WOULD RUN OUT BEFORE YOU GOT MONEY TO BUY MORE.: NEVER TRUE

## 2021-10-29 SDOH — ECONOMIC STABILITY: FOOD INSECURITY: WITHIN THE PAST 12 MONTHS, THE FOOD YOU BOUGHT JUST DIDN'T LAST AND YOU DIDN'T HAVE MONEY TO GET MORE.: NEVER TRUE

## 2021-10-29 ASSESSMENT — PATIENT HEALTH QUESTIONNAIRE - PHQ9
1. LITTLE INTEREST OR PLEASURE IN DOING THINGS: 0
SUM OF ALL RESPONSES TO PHQ QUESTIONS 1-9: 0
SUM OF ALL RESPONSES TO PHQ9 QUESTIONS 1 & 2: 0
2. FEELING DOWN, DEPRESSED OR HOPELESS: 0

## 2021-10-29 ASSESSMENT — ENCOUNTER SYMPTOMS
WHEEZING: 0
TROUBLE SWALLOWING: 0
BACK PAIN: 0
CONSTIPATION: 0
DIARRHEA: 0
NAUSEA: 0
SHORTNESS OF BREATH: 0
SORE THROAT: 0
CHEST TIGHTNESS: 0
COUGH: 0
ABDOMINAL PAIN: 0
VOMITING: 0

## 2021-10-29 ASSESSMENT — SOCIAL DETERMINANTS OF HEALTH (SDOH): HOW HARD IS IT FOR YOU TO PAY FOR THE VERY BASICS LIKE FOOD, HOUSING, MEDICAL CARE, AND HEATING?: NOT HARD AT ALL

## 2021-10-29 NOTE — TELEPHONE ENCOUNTER
Received fax from [facility] - two sets of directions on Atenolol requesting clarification.

## 2021-10-29 NOTE — PROGRESS NOTES
Well Adult Note  Name: Tapan Castellon Date: 10/29/2021   MRN: <[id number]> Sex: Female   Age: 48 y.o. Ethnicity: Non- / Non    : 1975 Race: White (non-)      Lorri Davila is here for well adult exam.        Review of Systems   Constitutional: Negative for activity change, appetite change, chills, diaphoresis, fatigue, fever and unexpected weight change. HENT: Negative for congestion, ear pain, sore throat and trouble swallowing. Eyes: Positive for visual disturbance (having cataract surgery). Respiratory: Negative for cough, chest tightness, shortness of breath and wheezing. Cardiovascular: Positive for palpitations. Negative for chest pain and leg swelling. Gastrointestinal: Negative for abdominal pain, constipation, diarrhea, nausea and vomiting. Endocrine: Negative for cold intolerance, heat intolerance, polydipsia, polyphagia and polyuria. Genitourinary: Negative for hematuria and menstrual problem. Musculoskeletal: Negative for arthralgias, back pain and myalgias. Neurological: Positive for headaches. Negative for weakness, light-headedness and numbness. Hematological: Negative for adenopathy. Does not bruise/bleed easily. Psychiatric/Behavioral: Positive for sleep disturbance. Negative for dysphoric mood. The patient is not nervous/anxious. Allergies   Allergen Reactions    Bactrim [Sulfamethoxazole-Trimethoprim] Hives    Bee Venom Anaphylaxis    Sulfa Antibiotics Hives         Prior to Visit Medications    Medication Sig Taking? Authorizing Provider   atenolol (TENORMIN) 25 MG tablet Take 0.5 tablets by mouth daily 1 qd Yes ANKUR Esparza CNP   buPROPion (WELLBUTRIN SR) 200 MG extended release tablet TAKE ONE TABLET BY MOUTH TWO TIMES A DAY.  Yes ANKUR Esparza CNP   scopolamine (TRANSDERM-SCOP) transdermal patch Place 1 patch onto the skin every 72 hours Yes ANKUR Esparza CNP   butalbital-acetaminophen-caffeine (FIORICET, ESGIC) -40 MG per tablet TAKE ONE TABLET BY MOUTH EVERY 4 HOURS AS NEEDED FOR HEADACHES OR MIGRAINE Yes Lo Severe, APRN - CNP   cyclobenzaprine (FLEXERIL) 5 MG tablet Take 1 tablet by mouth 2 times daily as needed for Muscle spasms Yes Lo Severe, APRN - CNP   Crisaborole (EUCRISA) 2 % OINT Apply 1 applicator topically 2 times daily Yes Lo Severe, APRN - CNP   halcinonide (HALOG) 0.1 % CREA Apply 1 Tube topically daily Yes Lo Severe, APRN - CNP   EPINEPHrine (EPIPEN 2-HENRRY) 0.3 MG/0.3ML SOAJ injection Inject 0.3 mLs into the muscle once for 1 dose Use as directed for allergic reaction Yes Lo Severe, APRN - CNP   mupirocin (BACTROBAN) 2 % ointment Apply topically 2 times daily. Patient not taking: Reported on 10/29/2021  Lo Severe, APRN - CNP         Past Medical History:   Diagnosis Date    Eczema     Headache     Migraine     Motion sickness        Past Surgical History:   Procedure Laterality Date    LYMPH NODE BIOPSY           Family History   Problem Relation Age of Onset    Cancer Father        Social History     Tobacco Use    Smoking status: Never Smoker    Smokeless tobacco: Never Used   Substance Use Topics    Alcohol use: Yes     Comment: rarely    Drug use: No       Objective   /66   Pulse 85   Temp 97.3 °F (36.3 °C) (Temporal)   Resp 16   Ht 5' 5\" (1.651 m)   Wt 150 lb 9.6 oz (68.3 kg)   LMP 10/26/2021 (Exact Date)   SpO2 98%   BMI 25.06 kg/m²   Wt Readings from Last 3 Encounters:   10/29/21 150 lb 9.6 oz (68.3 kg)   10/07/20 140 lb (63.5 kg)   09/24/20 140 lb (63.5 kg)       Physical Exam  Constitutional:       General: She is not in acute distress. Appearance: Normal appearance. She is well-developed and normal weight. She is not ill-appearing. HENT:      Head: Normocephalic and atraumatic. Right Ear: Tympanic membrane, ear canal and external ear normal. There is no impacted cerumen.       Left Ear: Tympanic membrane, ear canal and external ear normal. There is no impacted cerumen. Nose: Nose normal.      Mouth/Throat:      Mouth: Mucous membranes are moist.      Pharynx: Oropharynx is clear. Eyes:      Extraocular Movements: Extraocular movements intact. Conjunctiva/sclera: Conjunctivae normal.      Pupils: Pupils are equal, round, and reactive to light. Neck:      Thyroid: No thyromegaly. Vascular: No carotid bruit. Trachea: No tracheal deviation. Cardiovascular:      Rate and Rhythm: Normal rate and regular rhythm. Pulses: Normal pulses. Heart sounds: Normal heart sounds. No murmur heard. Pulmonary:      Effort: Pulmonary effort is normal. No respiratory distress. Breath sounds: Normal breath sounds. No wheezing or rales. Abdominal:      General: Bowel sounds are normal. There is no distension. Palpations: Abdomen is soft. There is no mass. Tenderness: There is no abdominal tenderness. There is no right CVA tenderness, left CVA tenderness or guarding. Musculoskeletal:         General: No tenderness. Cervical back: Normal range of motion. Right lower leg: No edema. Left lower leg: No edema. Lymphadenopathy:      Cervical: No cervical adenopathy. Skin:     General: Skin is warm and dry. Neurological:      General: No focal deficit present. Mental Status: She is alert and oriented to person, place, and time. Cranial Nerves: No cranial nerve deficit. Psychiatric:         Mood and Affect: Mood normal.         Behavior: Behavior normal.           Assessment   Plan   1. Encounter for well adult exam without abnormal findings  2. H/O motion sickness  -     scopolamine (TRANSDERM-SCOP) transdermal patch; Place 1 patch onto the skin every 72 hours, Disp-4 patch, R-2Normal  -prn only    3. Palpitations  -     atenolol (TENORMIN) 25 MG tablet;  Take 0.5 tablets by mouth daily 1 qd, Disp-45 tablet, R-0Normal  -follows with Dr. Alfredo Novoa for

## 2021-11-08 ENCOUNTER — OFFICE VISIT (OUTPATIENT)
Dept: FAMILY MEDICINE CLINIC | Age: 46
End: 2021-11-08
Payer: COMMERCIAL

## 2021-11-08 VITALS
HEART RATE: 82 BPM | SYSTOLIC BLOOD PRESSURE: 116 MMHG | WEIGHT: 148.8 LBS | DIASTOLIC BLOOD PRESSURE: 68 MMHG | BODY MASS INDEX: 24.79 KG/M2 | OXYGEN SATURATION: 98 % | HEIGHT: 65 IN | TEMPERATURE: 97.8 F | RESPIRATION RATE: 16 BRPM

## 2021-11-08 DIAGNOSIS — Z01.818 PREOP EXAMINATION: Primary | ICD-10-CM

## 2021-11-08 DIAGNOSIS — H25.9 AGE-RELATED CATARACT OF BOTH EYES, UNSPECIFIED AGE-RELATED CATARACT TYPE: ICD-10-CM

## 2021-11-08 PROCEDURE — 99213 OFFICE O/P EST LOW 20 MIN: CPT | Performed by: FAMILY MEDICINE

## 2021-11-08 ASSESSMENT — ENCOUNTER SYMPTOMS
DIARRHEA: 0
ABDOMINAL PAIN: 0
WHEEZING: 0
VOMITING: 0
COUGH: 0
NAUSEA: 0
SHORTNESS OF BREATH: 0
CONSTIPATION: 0
BLOOD IN STOOL: 0

## 2021-11-08 NOTE — PROGRESS NOTES
Farooq Fernandez (:  1975) is a 48 y.o. female,Established patient, here for evaluation of the following chief complaint(s):  Pre-op Exam (cataract removal w/ intraocular lens implant rt eye - Dr Ajit Adrain)         ASSESSMENT/PLAN:  1. Preop examination  2. Age-related cataract of both eyes, unspecified age-related cataract type      No follow-ups on file. Subjective   SUBJECTIVE/OBJECTIVE:  Pre-op Exam (cataract removal w/ intraocular lens implant rt eye - Dr Ajit Adrian)        Review of Systems   Constitutional: Negative for chills, diaphoresis and fever. HENT: Negative for ear discharge, ear pain, hearing loss, nosebleeds and tinnitus. Respiratory: Negative for cough, shortness of breath and wheezing. Cardiovascular: Negative for chest pain. Gastrointestinal: Negative for abdominal pain, blood in stool, constipation, diarrhea, nausea and vomiting. Genitourinary: Negative for dysuria, flank pain and hematuria. Musculoskeletal: Negative for myalgias. Skin: Negative for rash. Neurological: Negative for headaches. Hematological: Does not bruise/bleed easily. Psychiatric/Behavioral: Negative for hallucinations and suicidal ideas. Objective   /68   Pulse 82   Temp 97.8 °F (36.6 °C) (Temporal)   Resp 16   Ht 5' 5\" (1.651 m)   Wt 148 lb 12.8 oz (67.5 kg)   LMP 10/26/2021 (Exact Date)   SpO2 98%   BMI 24.76 kg/m²   No results found for: LABA1C  Physical Exam  Constitutional:       General: She is not in acute distress. Appearance: She is well-developed. Eyes:      General: No scleral icterus. Neck:      Thyroid: No thyromegaly. Vascular: No JVD. Trachea: No tracheal deviation. Cardiovascular:      Heart sounds: No gallop. Pulmonary:      Effort: No respiratory distress. Breath sounds: No wheezing. Abdominal:      Palpations: Abdomen is soft. Musculoskeletal:         General: No tenderness. Skin:     Findings: No erythema. Neurological:      Deep Tendon Reflexes: Reflexes normal.            On this date 11/8/2021 I have spent 21 minutes reviewing previous notes, test results and face to face with the patient discussing the diagnosis and importance of compliance with the treatment plan as well as documenting on the day of the visit. Michael Valladares is medically cleared for cataract surgery. An electronic signature was used to authenticate this note.     --Franny Samuels, DO

## 2021-11-17 DIAGNOSIS — J10.1 INFLUENZA A: ICD-10-CM

## 2021-11-17 DIAGNOSIS — R68.89 FLU-LIKE SYMPTOMS: Primary | ICD-10-CM

## 2021-11-17 PROCEDURE — 87804 INFLUENZA ASSAY W/OPTIC: CPT | Performed by: NURSE PRACTITIONER

## 2021-11-17 RX ORDER — OSELTAMIVIR PHOSPHATE 75 MG/1
75 CAPSULE ORAL 2 TIMES DAILY
Qty: 10 CAPSULE | Refills: 0 | Status: SHIPPED | OUTPATIENT
Start: 2021-11-17 | End: 2021-11-22

## 2021-11-18 LAB
INFLUENZA A ANTIBODY: POSITIVE
INFLUENZA B ANTIBODY: NORMAL

## 2021-11-29 ENCOUNTER — TELEPHONE (OUTPATIENT)
Dept: FAMILY MEDICINE CLINIC | Age: 46
End: 2021-11-29

## 2021-11-29 DIAGNOSIS — U07.1 COVID-19: Primary | ICD-10-CM

## 2021-11-29 RX ORDER — ONDANSETRON 4 MG/1
4 TABLET, FILM COATED ORAL DAILY PRN
Qty: 30 TABLET | Refills: 0 | Status: SHIPPED
Start: 2021-11-29 | End: 2022-03-24 | Stop reason: SDUPTHER

## 2021-12-28 ENCOUNTER — OFFICE VISIT (OUTPATIENT)
Dept: FAMILY MEDICINE CLINIC | Age: 46
End: 2021-12-28
Payer: COMMERCIAL

## 2021-12-28 VITALS — HEIGHT: 65 IN | WEIGHT: 148 LBS | RESPIRATION RATE: 16 BRPM | BODY MASS INDEX: 24.66 KG/M2

## 2021-12-28 DIAGNOSIS — H26.003 JUVENILE CATARACT OF BOTH EYES, UNSPECIFIED INFANTILE/JUVENILE CATARACT TYPE: Primary | ICD-10-CM

## 2021-12-28 PROCEDURE — 99213 OFFICE O/P EST LOW 20 MIN: CPT | Performed by: FAMILY MEDICINE

## 2021-12-28 ASSESSMENT — ENCOUNTER SYMPTOMS
WHEEZING: 0
NAUSEA: 0
SHORTNESS OF BREATH: 0
ABDOMINAL PAIN: 0
BLOOD IN STOOL: 0
VOMITING: 0
DIARRHEA: 0
COUGH: 0
CONSTIPATION: 0

## 2021-12-28 NOTE — PROGRESS NOTES
Meliza Ayala (:  1975) is a 48 y.o. female,Established patient, here for evaluation of the following chief complaint(s):  Preop cataract surgery       ASSESSMENT/PLAN:  1. Juvenile cataract of both eyes, unspecified Infantile/juvenile cataract type      No follow-ups on file. Subjective   SUBJECTIVE/OBJECTIVE:  HPI    Review of Systems   Constitutional: Negative for chills, diaphoresis and fever. HENT: Negative for ear discharge, ear pain, hearing loss, nosebleeds and tinnitus. Respiratory: Negative for cough, shortness of breath and wheezing. Cardiovascular: Negative for chest pain. Gastrointestinal: Negative for abdominal pain, blood in stool, constipation, diarrhea, nausea and vomiting. Genitourinary: Negative for dysuria, flank pain and hematuria. Musculoskeletal: Negative for myalgias. Skin: Negative for rash. Neurological: Negative for headaches. Hematological: Does not bruise/bleed easily. Psychiatric/Behavioral: Negative for hallucinations and suicidal ideas. Objective   Resp 16   Ht 5' 5\" (1.651 m)   Wt 148 lb (67.1 kg)   BMI 24.63 kg/m²   No results found for: LABA1C  Physical Exam  Cardiovascular:      Rate and Rhythm: Normal rate and regular rhythm. Musculoskeletal:      Comments: Tight ropy and decreased ROM SB and rotation  HVLA done . Slight functional right short leg. .   Skin:     Capillary Refill: Capillary refill takes less than 2 seconds. Neurological:      Mental Status: She is alert. On this date 2021 I have spent 21 minutes reviewing previous notes, test results and face to face with the patient discussing the diagnosis and importance of compliance with the treatment plan as well as documenting on the day of the visit. Katerina Daniel is medically cleared for cataract surgery. An electronic signature was used to authenticate this note.     --Tamsen Olszewski, DO

## 2022-02-17 DIAGNOSIS — G43.109 MIGRAINE WITH AURA AND WITHOUT STATUS MIGRAINOSUS, NOT INTRACTABLE: ICD-10-CM

## 2022-02-17 RX ORDER — BUTALBITAL, ACETAMINOPHEN AND CAFFEINE 50; 325; 40 MG/1; MG/1; MG/1
TABLET ORAL
Qty: 30 TABLET | Refills: 0 | Status: SHIPPED
Start: 2022-02-17 | End: 2022-07-25

## 2022-03-24 DIAGNOSIS — R00.2 PALPITATIONS: ICD-10-CM

## 2022-03-24 RX ORDER — ONDANSETRON 4 MG/1
4 TABLET, FILM COATED ORAL DAILY PRN
Qty: 30 TABLET | Refills: 0 | Status: SHIPPED | OUTPATIENT
Start: 2022-03-24

## 2022-03-24 RX ORDER — CRISABOROLE 20 MG/G
1 OINTMENT TOPICAL 2 TIMES DAILY
Qty: 60 G | Refills: 5 | Status: SHIPPED | OUTPATIENT
Start: 2022-03-24

## 2022-03-24 RX ORDER — HALCINONIDE 1 MG/G
1 CREAM TOPICAL DAILY
Qty: 60 G | Refills: 5 | Status: SHIPPED | OUTPATIENT
Start: 2022-03-24

## 2022-03-24 RX ORDER — ATENOLOL 25 MG/1
12.5 TABLET ORAL DAILY
Qty: 45 TABLET | Refills: 1 | Status: SHIPPED | OUTPATIENT
Start: 2022-03-24

## 2022-03-24 NOTE — TELEPHONE ENCOUNTER
Requested Prescriptions     Pending Prescriptions Disp Refills    atenolol (TENORMIN) 25 MG tablet 45 tablet 1     Sig: Take 0.5 tablets by mouth daily    ondansetron (ZOFRAN) 4 MG tablet 30 tablet 0     Sig: Take 1 tablet by mouth daily as needed for Nausea or Vomiting    halcinonide (HALOG) 0.1 % CREA 60 g 5     Sig: Apply 1 Tube topically daily    Crisaborole (EUCRISA) 2 % OINT 60 g 5     Sig: Apply 1 applicator topically 2 times daily       Next appt is Visit date not found  Last appt was 12/28/2021

## 2022-04-06 DIAGNOSIS — F32.A ANXIETY AND DEPRESSION: ICD-10-CM

## 2022-04-06 DIAGNOSIS — F41.9 ANXIETY AND DEPRESSION: ICD-10-CM

## 2022-04-07 RX ORDER — BUPROPION HYDROCHLORIDE 200 MG/1
TABLET, EXTENDED RELEASE ORAL
Qty: 180 TABLET | Refills: 0 | Status: SHIPPED
Start: 2022-04-07 | End: 2022-07-12

## 2022-05-03 DIAGNOSIS — Z11.52 ENCOUNTER FOR SCREENING FOR COVID-19: Primary | ICD-10-CM

## 2022-05-03 LAB
Lab: NORMAL
PERFORMING INSTRUMENT: NORMAL
QC PASS/FAIL: NORMAL
SARS-COV-2, POC: NORMAL

## 2022-05-03 PROCEDURE — 87426 SARSCOV CORONAVIRUS AG IA: CPT | Performed by: NURSE PRACTITIONER

## 2022-06-06 LAB — MAMMOGRAPHY, EXTERNAL: NORMAL

## 2022-07-12 DIAGNOSIS — F41.9 ANXIETY AND DEPRESSION: ICD-10-CM

## 2022-07-12 DIAGNOSIS — F32.A ANXIETY AND DEPRESSION: ICD-10-CM

## 2022-07-12 RX ORDER — BUPROPION HYDROCHLORIDE 200 MG/1
TABLET, EXTENDED RELEASE ORAL
Qty: 180 TABLET | Refills: 1 | Status: SHIPPED | OUTPATIENT
Start: 2022-07-12

## 2022-07-23 DIAGNOSIS — G43.109 MIGRAINE WITH AURA AND WITHOUT STATUS MIGRAINOSUS, NOT INTRACTABLE: ICD-10-CM

## 2022-07-25 RX ORDER — BUTALBITAL, ACETAMINOPHEN AND CAFFEINE 50; 325; 40 MG/1; MG/1; MG/1
TABLET ORAL
Qty: 30 TABLET | Refills: 0 | Status: SHIPPED | OUTPATIENT
Start: 2022-07-25

## 2022-09-29 DIAGNOSIS — R10.9 STOMACHACHE: Primary | ICD-10-CM

## 2022-09-29 DIAGNOSIS — R52 BODY ACHES: ICD-10-CM

## 2022-09-30 DIAGNOSIS — R10.9 STOMACHACHE: ICD-10-CM

## 2022-09-30 DIAGNOSIS — R52 BODY ACHES: ICD-10-CM

## 2022-09-30 LAB — SARS-COV-2, PCR: NOT DETECTED

## 2022-10-07 DIAGNOSIS — Z23 IMMUNIZATION DUE: Primary | ICD-10-CM

## 2022-10-07 PROCEDURE — 90471 IMMUNIZATION ADMIN: CPT | Performed by: FAMILY MEDICINE

## 2022-10-07 PROCEDURE — 90674 CCIIV4 VAC NO PRSV 0.5 ML IM: CPT | Performed by: FAMILY MEDICINE

## 2022-10-13 DIAGNOSIS — L30.9 ECZEMA, UNSPECIFIED TYPE: Primary | ICD-10-CM

## 2022-12-11 DIAGNOSIS — G43.109 MIGRAINE WITH AURA AND WITHOUT STATUS MIGRAINOSUS, NOT INTRACTABLE: ICD-10-CM

## 2022-12-12 RX ORDER — ONDANSETRON 4 MG/1
4 TABLET, FILM COATED ORAL DAILY PRN
Qty: 30 TABLET | Refills: 0 | Status: SHIPPED | OUTPATIENT
Start: 2022-12-12

## 2022-12-12 RX ORDER — BUTALBITAL, ACETAMINOPHEN AND CAFFEINE 50; 325; 40 MG/1; MG/1; MG/1
TABLET ORAL
Qty: 30 TABLET | Refills: 0 | Status: SHIPPED | OUTPATIENT
Start: 2022-12-12

## 2023-05-24 DIAGNOSIS — G43.109 MIGRAINE WITH AURA AND WITHOUT STATUS MIGRAINOSUS, NOT INTRACTABLE: ICD-10-CM

## 2023-05-24 RX ORDER — BUTALBITAL, ACETAMINOPHEN AND CAFFEINE 50; 325; 40 MG/1; MG/1; MG/1
TABLET ORAL
Qty: 30 TABLET | Refills: 0 | Status: SHIPPED | OUTPATIENT
Start: 2023-05-24

## 2023-05-24 NOTE — TELEPHONE ENCOUNTER
Requested Prescriptions     Pending Prescriptions Disp Refills    butalbital-acetaminophen-caffeine (FIORICET, ESGIC) -40 MG per tablet 30 tablet 0     Sig: TAKE ONE TABLET BY MOUTH EVERY 4 HOURS AS NEEDED for headaches or migraine  Strength: -40 mg       Next appt is Visit date not found  Last appt was 12/28/2021

## 2023-06-23 DIAGNOSIS — F32.A ANXIETY AND DEPRESSION: ICD-10-CM

## 2023-06-23 DIAGNOSIS — F41.9 ANXIETY AND DEPRESSION: ICD-10-CM

## 2023-06-23 RX ORDER — BUPROPION HYDROCHLORIDE 200 MG/1
200 TABLET, EXTENDED RELEASE ORAL 2 TIMES DAILY
Qty: 180 TABLET | Refills: 1 | Status: SHIPPED | OUTPATIENT
Start: 2023-06-23

## 2023-07-10 ENCOUNTER — OFFICE VISIT (OUTPATIENT)
Dept: FAMILY MEDICINE CLINIC | Age: 48
End: 2023-07-10
Payer: COMMERCIAL

## 2023-07-10 VITALS
OXYGEN SATURATION: 96 % | BODY MASS INDEX: 24.79 KG/M2 | SYSTOLIC BLOOD PRESSURE: 110 MMHG | DIASTOLIC BLOOD PRESSURE: 70 MMHG | HEIGHT: 65 IN | WEIGHT: 148.8 LBS | HEART RATE: 58 BPM | RESPIRATION RATE: 16 BRPM

## 2023-07-10 DIAGNOSIS — R00.2 PALPITATIONS: ICD-10-CM

## 2023-07-10 DIAGNOSIS — Z13.220 LIPID SCREENING: ICD-10-CM

## 2023-07-10 DIAGNOSIS — R53.83 FATIGUE, UNSPECIFIED TYPE: ICD-10-CM

## 2023-07-10 DIAGNOSIS — G47.00 INSOMNIA, UNSPECIFIED TYPE: ICD-10-CM

## 2023-07-10 DIAGNOSIS — E55.9 VITAMIN D DEFICIENCY: ICD-10-CM

## 2023-07-10 DIAGNOSIS — Z00.00 ENCOUNTER FOR WELL ADULT EXAM WITHOUT ABNORMAL FINDINGS: Primary | ICD-10-CM

## 2023-07-10 PROCEDURE — 99396 PREV VISIT EST AGE 40-64: CPT | Performed by: NURSE PRACTITIONER

## 2023-07-10 RX ORDER — TRAZODONE HYDROCHLORIDE 50 MG/1
50 TABLET ORAL NIGHTLY PRN
Qty: 30 TABLET | Refills: 3 | Status: SHIPPED | OUTPATIENT
Start: 2023-07-10

## 2023-07-10 RX ORDER — DUPILUMAB 300 MG/2ML
INJECTION, SOLUTION SUBCUTANEOUS
COMMUNITY
Start: 2023-07-06

## 2023-07-10 RX ORDER — FLUTICASONE PROPIONATE 110 UG/1
1 AEROSOL, METERED RESPIRATORY (INHALATION) 2 TIMES DAILY
COMMUNITY

## 2023-07-10 RX ORDER — CETIRIZINE HYDROCHLORIDE 10 MG/1
TABLET ORAL
COMMUNITY

## 2023-07-10 RX ORDER — ALBUTEROL SULFATE 90 UG/1
AEROSOL, METERED RESPIRATORY (INHALATION)
COMMUNITY
Start: 2022-05-02

## 2023-07-10 RX ORDER — CLOBETASOL PROPIONATE 0.5 MG/G
CREAM TOPICAL
COMMUNITY
Start: 2023-01-17

## 2023-07-10 SDOH — ECONOMIC STABILITY: FOOD INSECURITY: WITHIN THE PAST 12 MONTHS, YOU WORRIED THAT YOUR FOOD WOULD RUN OUT BEFORE YOU GOT MONEY TO BUY MORE.: NEVER TRUE

## 2023-07-10 SDOH — ECONOMIC STABILITY: INCOME INSECURITY: HOW HARD IS IT FOR YOU TO PAY FOR THE VERY BASICS LIKE FOOD, HOUSING, MEDICAL CARE, AND HEATING?: NOT HARD AT ALL

## 2023-07-10 SDOH — ECONOMIC STABILITY: HOUSING INSECURITY
IN THE LAST 12 MONTHS, WAS THERE A TIME WHEN YOU DID NOT HAVE A STEADY PLACE TO SLEEP OR SLEPT IN A SHELTER (INCLUDING NOW)?: NO

## 2023-07-10 SDOH — ECONOMIC STABILITY: FOOD INSECURITY: WITHIN THE PAST 12 MONTHS, THE FOOD YOU BOUGHT JUST DIDN'T LAST AND YOU DIDN'T HAVE MONEY TO GET MORE.: NEVER TRUE

## 2023-07-10 ASSESSMENT — PATIENT HEALTH QUESTIONNAIRE - PHQ9
3. TROUBLE FALLING OR STAYING ASLEEP: 2
SUM OF ALL RESPONSES TO PHQ9 QUESTIONS 1 & 2: 0
6. FEELING BAD ABOUT YOURSELF - OR THAT YOU ARE A FAILURE OR HAVE LET YOURSELF OR YOUR FAMILY DOWN: 0
8. MOVING OR SPEAKING SO SLOWLY THAT OTHER PEOPLE COULD HAVE NOTICED. OR THE OPPOSITE, BEING SO FIGETY OR RESTLESS THAT YOU HAVE BEEN MOVING AROUND A LOT MORE THAN USUAL: 0
5. POOR APPETITE OR OVEREATING: 0
4. FEELING TIRED OR HAVING LITTLE ENERGY: 1
1. LITTLE INTEREST OR PLEASURE IN DOING THINGS: 0
10. IF YOU CHECKED OFF ANY PROBLEMS, HOW DIFFICULT HAVE THESE PROBLEMS MADE IT FOR YOU TO DO YOUR WORK, TAKE CARE OF THINGS AT HOME, OR GET ALONG WITH OTHER PEOPLE: 0
9. THOUGHTS THAT YOU WOULD BE BETTER OFF DEAD, OR OF HURTING YOURSELF: 0
SUM OF ALL RESPONSES TO PHQ QUESTIONS 1-9: 5
7. TROUBLE CONCENTRATING ON THINGS, SUCH AS READING THE NEWSPAPER OR WATCHING TELEVISION: 2
SUM OF ALL RESPONSES TO PHQ QUESTIONS 1-9: 5
2. FEELING DOWN, DEPRESSED OR HOPELESS: 0

## 2023-07-10 ASSESSMENT — ENCOUNTER SYMPTOMS
CHEST TIGHTNESS: 0
VOMITING: 0
ABDOMINAL PAIN: 0
BLOOD IN STOOL: 0
SORE THROAT: 0
COUGH: 0
DIARRHEA: 0
SHORTNESS OF BREATH: 1
NAUSEA: 0
BACK PAIN: 0
CONSTIPATION: 0
WHEEZING: 1

## 2023-07-11 DIAGNOSIS — Z00.00 ENCOUNTER FOR WELL ADULT EXAM WITHOUT ABNORMAL FINDINGS: ICD-10-CM

## 2023-07-11 DIAGNOSIS — E55.9 VITAMIN D DEFICIENCY: ICD-10-CM

## 2023-07-11 DIAGNOSIS — Z13.220 LIPID SCREENING: ICD-10-CM

## 2023-07-11 DIAGNOSIS — R53.83 FATIGUE, UNSPECIFIED TYPE: ICD-10-CM

## 2023-07-11 DIAGNOSIS — R00.2 PALPITATIONS: ICD-10-CM

## 2023-07-11 LAB
ALBUMIN SERPL-MCNC: 4.2 G/DL (ref 3.5–5.2)
ALP SERPL-CCNC: 49 U/L (ref 35–104)
ALT SERPL-CCNC: 11 U/L (ref 0–32)
ANION GAP SERPL CALCULATED.3IONS-SCNC: 13 MMOL/L (ref 7–16)
AST SERPL-CCNC: 20 U/L (ref 0–31)
BASOPHILS # BLD: 0.03 E9/L (ref 0–0.2)
BASOPHILS NFR BLD: 0.6 % (ref 0–2)
BILIRUB SERPL-MCNC: <0.2 MG/DL (ref 0–1.2)
BUN SERPL-MCNC: 20 MG/DL (ref 6–20)
CALCIUM SERPL-MCNC: 9 MG/DL (ref 8.6–10.2)
CHLORIDE SERPL-SCNC: 105 MMOL/L (ref 98–107)
CHOLESTEROL, TOTAL: 171 MG/DL (ref 0–199)
CO2 SERPL-SCNC: 23 MMOL/L (ref 22–29)
CREAT SERPL-MCNC: 0.8 MG/DL (ref 0.5–1)
EOSINOPHIL # BLD: 0.47 E9/L (ref 0.05–0.5)
EOSINOPHIL NFR BLD: 9 % (ref 0–6)
ERYTHROCYTE [DISTWIDTH] IN BLOOD BY AUTOMATED COUNT: 13.4 FL (ref 11.5–15)
GLUCOSE SERPL-MCNC: 85 MG/DL (ref 74–99)
HCT VFR BLD AUTO: 41.2 % (ref 34–48)
HDLC SERPL-MCNC: 70 MG/DL
HGB BLD-MCNC: 12.6 G/DL (ref 11.5–15.5)
IMM GRANULOCYTES # BLD: 0.01 E9/L
IMM GRANULOCYTES NFR BLD: 0.2 % (ref 0–5)
LDLC SERPL CALC-MCNC: 83 MG/DL (ref 0–99)
LYMPHOCYTES # BLD: 2.06 E9/L (ref 1.5–4)
LYMPHOCYTES NFR BLD: 39.5 % (ref 20–42)
MCH RBC QN AUTO: 27.5 PG (ref 26–35)
MCHC RBC AUTO-ENTMCNC: 30.6 % (ref 32–34.5)
MCV RBC AUTO: 89.8 FL (ref 80–99.9)
MONOCYTES # BLD: 0.44 E9/L (ref 0.1–0.95)
MONOCYTES NFR BLD: 8.4 % (ref 2–12)
NEUTROPHILS # BLD: 2.2 E9/L (ref 1.8–7.3)
NEUTS SEG NFR BLD: 42.3 % (ref 43–80)
PLATELET # BLD AUTO: 251 E9/L (ref 130–450)
PMV BLD AUTO: 11 FL (ref 7–12)
POTASSIUM SERPL-SCNC: 4.2 MMOL/L (ref 3.5–5)
PROT SERPL-MCNC: 7.3 G/DL (ref 6.4–8.3)
RBC # BLD AUTO: 4.59 E12/L (ref 3.5–5.5)
SODIUM SERPL-SCNC: 141 MMOL/L (ref 132–146)
TRIGL SERPL-MCNC: 90 MG/DL (ref 0–149)
TSH SERPL-MCNC: 3.33 UIU/ML (ref 0.27–4.2)
VITAMIN D 25-HYDROXY: 33 NG/ML (ref 30–100)
VLDLC SERPL CALC-MCNC: 18 MG/DL
WBC # BLD: 5.2 E9/L (ref 4.5–11.5)

## 2023-08-15 RX ORDER — CEPHALEXIN 500 MG/1
500 CAPSULE ORAL 4 TIMES DAILY
Qty: 40 CAPSULE | Refills: 0 | Status: SHIPPED | OUTPATIENT
Start: 2023-08-15 | End: 2023-08-25

## 2023-12-06 DIAGNOSIS — G43.109 MIGRAINE WITH AURA AND WITHOUT STATUS MIGRAINOSUS, NOT INTRACTABLE: ICD-10-CM

## 2023-12-06 RX ORDER — BUTALBITAL, ACETAMINOPHEN AND CAFFEINE 50; 325; 40 MG/1; MG/1; MG/1
TABLET ORAL
Qty: 30 TABLET | Refills: 0 | Status: SHIPPED | OUTPATIENT
Start: 2023-12-06

## 2024-01-05 DIAGNOSIS — G43.109 MIGRAINE WITH AURA AND WITHOUT STATUS MIGRAINOSUS, NOT INTRACTABLE: ICD-10-CM

## 2024-01-05 RX ORDER — CYCLOBENZAPRINE HCL 5 MG
5 TABLET ORAL 2 TIMES DAILY PRN
Qty: 60 TABLET | Refills: 2 | Status: SHIPPED | OUTPATIENT
Start: 2024-01-05

## 2024-01-09 DIAGNOSIS — F41.9 ANXIETY AND DEPRESSION: ICD-10-CM

## 2024-01-09 DIAGNOSIS — F32.A ANXIETY AND DEPRESSION: ICD-10-CM

## 2024-01-10 RX ORDER — BUPROPION HYDROCHLORIDE 200 MG/1
200 TABLET, EXTENDED RELEASE ORAL 2 TIMES DAILY
Qty: 180 TABLET | Refills: 0 | Status: SHIPPED | OUTPATIENT
Start: 2024-01-10

## 2024-01-16 DIAGNOSIS — Z13.220 LIPID SCREENING: Primary | ICD-10-CM

## 2024-01-16 DIAGNOSIS — Z13.1 DIABETES MELLITUS SCREENING: ICD-10-CM

## 2024-01-25 DIAGNOSIS — Z13.1 DIABETES MELLITUS SCREENING: ICD-10-CM

## 2024-01-25 DIAGNOSIS — Z13.220 LIPID SCREENING: ICD-10-CM

## 2024-01-25 LAB
CHOLESTEROL: 212 MG/DL
GLUCOSE FASTING: 97 MG/DL (ref 74–99)
HBA1C MFR BLD: 5.5 % (ref 4–5.6)
HDLC SERPL-MCNC: 85 MG/DL
LDL CHOLESTEROL: 116 MG/DL
TRIGL SERPL-MCNC: 53 MG/DL
VLDLC SERPL CALC-MCNC: 11 MG/DL

## 2024-02-09 RX ORDER — ONDANSETRON 4 MG/1
4 TABLET, FILM COATED ORAL DAILY PRN
Qty: 30 TABLET | Refills: 0 | Status: SHIPPED | OUTPATIENT
Start: 2024-02-09

## 2024-04-10 DIAGNOSIS — F41.9 ANXIETY AND DEPRESSION: ICD-10-CM

## 2024-04-10 DIAGNOSIS — F32.A ANXIETY AND DEPRESSION: ICD-10-CM

## 2024-04-11 RX ORDER — BUPROPION HYDROCHLORIDE 200 MG/1
200 TABLET, EXTENDED RELEASE ORAL 2 TIMES DAILY
Qty: 180 TABLET | Refills: 1 | Status: SHIPPED | OUTPATIENT
Start: 2024-04-11

## 2024-04-11 NOTE — TELEPHONE ENCOUNTER
Requested Prescriptions     Pending Prescriptions Disp Refills    buPROPion (WELLBUTRIN SR) 200 MG extended release tablet 180 tablet 1     Sig: Take 1 tablet by mouth 2 times daily       Next appt is Visit date not found  Last appt was 7/10/2023

## 2024-04-26 DIAGNOSIS — G47.00 INSOMNIA, UNSPECIFIED TYPE: ICD-10-CM

## 2024-04-26 RX ORDER — TRAZODONE HYDROCHLORIDE 50 MG/1
50 TABLET ORAL NIGHTLY PRN
Qty: 30 TABLET | Refills: 3 | Status: SHIPPED | OUTPATIENT
Start: 2024-04-26

## 2024-05-30 DIAGNOSIS — I49.9 IRREGULAR CARDIAC RHYTHM: Primary | ICD-10-CM

## 2024-05-30 DIAGNOSIS — I49.9 IRREGULAR CARDIAC RHYTHM: ICD-10-CM

## 2024-05-30 PROCEDURE — 93000 ELECTROCARDIOGRAM COMPLETE: CPT | Performed by: FAMILY MEDICINE

## 2024-05-31 ENCOUNTER — TELEPHONE (OUTPATIENT)
Dept: FAMILY MEDICINE CLINIC | Age: 49
End: 2024-05-31

## 2024-05-31 DIAGNOSIS — R42 DIZZINESS: Primary | ICD-10-CM

## 2024-05-31 DIAGNOSIS — R55 PRE-SYNCOPE: ICD-10-CM

## 2024-05-31 NOTE — TELEPHONE ENCOUNTER
ASSESSMENT/PLAN:    1. Dizziness  - Cardiac event/MCOT monitor; Future    2. Pre-syncope  - Cardiac event/MCOT monitor; Future        FOLLOW-UP:  zeyad

## 2024-06-03 ENCOUNTER — TELEPHONE (OUTPATIENT)
Dept: NON INVASIVE DIAGNOSTICS | Age: 49
End: 2024-06-03

## 2024-06-03 NOTE — TELEPHONE ENCOUNTER
Called patient to verify address and insurance. Given patient instructions on how to log symptoms and return monitor. Instructed patient to call [facility] if they have any further questions while wearing the monitor

## 2024-06-06 DIAGNOSIS — G43.109 MIGRAINE WITH AURA AND WITHOUT STATUS MIGRAINOSUS, NOT INTRACTABLE: ICD-10-CM

## 2024-06-06 RX ORDER — BUTALBITAL, ACETAMINOPHEN AND CAFFEINE 50; 325; 40 MG/1; MG/1; MG/1
TABLET ORAL
Qty: 30 TABLET | Refills: 0 | Status: SHIPPED | OUTPATIENT
Start: 2024-06-06

## 2024-06-06 NOTE — TELEPHONE ENCOUNTER
Requested Prescriptions     Pending Prescriptions Disp Refills    butalbital-acetaminophen-caffeine (FIORICET, ESGIC) -40 MG per tablet 30 tablet 0     Sig: TAKE ONE TABLET BY MOUTH EVERY 4 HOURS AS NEEDED for headaches or migraine  Strength: -40 mg       Next appt is Visit date not found  Last appt was 7/10/2023

## 2024-07-05 DIAGNOSIS — R55 PRE-SYNCOPE: ICD-10-CM

## 2024-07-05 DIAGNOSIS — R42 DIZZINESS: ICD-10-CM

## 2024-09-16 LAB — MAMMOGRAPHY, EXTERNAL: NORMAL

## 2024-10-02 ENCOUNTER — TELEPHONE (OUTPATIENT)
Dept: FAMILY MEDICINE CLINIC | Age: 49
End: 2024-10-02

## 2024-10-29 DIAGNOSIS — F32.A ANXIETY AND DEPRESSION: ICD-10-CM

## 2024-10-29 DIAGNOSIS — F41.9 ANXIETY AND DEPRESSION: ICD-10-CM

## 2024-10-29 RX ORDER — BUPROPION HYDROCHLORIDE 200 MG/1
200 TABLET, EXTENDED RELEASE ORAL 2 TIMES DAILY
Qty: 180 TABLET | Refills: 0 | Status: SHIPPED | OUTPATIENT
Start: 2024-10-29

## 2024-10-29 NOTE — TELEPHONE ENCOUNTER
Name of Medication(s) Requested:  Requested Prescriptions     Pending Prescriptions Disp Refills    buPROPion (WELLBUTRIN SR) 200 MG extended release tablet [Pharmacy Med Name: buPROPion HCl ER (SR) Oral Tablet Extended Release 12 Hour 200 MG] 180 tablet 0     Sig: TAKE ONE TABLET BY MOUTH TWO TIMES A DAY       Medication is on current medication list Yes    Dosage and directions were verified? Yes    Quantity verified: 90 day supply     Pharmacy Verified?  Yes    Last Appointment:  Visit date not found    Future appts:  No future appointments.     (If no appt send self scheduling link. .REFILLAPPT)  Scheduling request sent?     [] Yes  [x] No    Does patient need updated?  [] Yes  [x] No

## 2024-11-02 ENCOUNTER — APPOINTMENT (OUTPATIENT)
Dept: GENERAL RADIOLOGY | Age: 49
End: 2024-11-02
Payer: COMMERCIAL

## 2024-11-02 ENCOUNTER — HOSPITAL ENCOUNTER (EMERGENCY)
Age: 49
Discharge: HOME | End: 2024-11-02
Attending: EMERGENCY MEDICINE
Payer: COMMERCIAL

## 2024-11-02 VITALS
HEIGHT: 67 IN | TEMPERATURE: 97 F | HEART RATE: 89 BPM | OXYGEN SATURATION: 100 % | WEIGHT: 150 LBS | SYSTOLIC BLOOD PRESSURE: 135 MMHG | BODY MASS INDEX: 23.54 KG/M2 | RESPIRATION RATE: 20 BRPM | DIASTOLIC BLOOD PRESSURE: 88 MMHG

## 2024-11-02 DIAGNOSIS — S61.212A LACERATION OF RIGHT MIDDLE FINGER WITHOUT FOREIGN BODY WITHOUT DAMAGE TO NAIL, INITIAL ENCOUNTER: ICD-10-CM

## 2024-11-02 DIAGNOSIS — S61.214A LACERATION OF RIGHT RING FINGER WITHOUT FOREIGN BODY WITHOUT DAMAGE TO NAIL, INITIAL ENCOUNTER: ICD-10-CM

## 2024-11-02 DIAGNOSIS — S61.216A LACERATION OF RIGHT LITTLE FINGER WITHOUT FOREIGN BODY WITHOUT DAMAGE TO NAIL, INITIAL ENCOUNTER: ICD-10-CM

## 2024-11-02 DIAGNOSIS — S61.209A FLEXOR TENDON LACERATION OF FINGER WITH OPEN WOUND, INITIAL ENCOUNTER: Primary | ICD-10-CM

## 2024-11-02 DIAGNOSIS — S56.129A FLEXOR TENDON LACERATION OF FINGER WITH OPEN WOUND, INITIAL ENCOUNTER: Primary | ICD-10-CM

## 2024-11-02 PROCEDURE — 6360000002 HC RX W HCPCS: Performed by: EMERGENCY MEDICINE

## 2024-11-02 PROCEDURE — 90714 TD VACC NO PRESV 7 YRS+ IM: CPT | Performed by: EMERGENCY MEDICINE

## 2024-11-02 PROCEDURE — 2500000003 HC RX 250 WO HCPCS

## 2024-11-02 PROCEDURE — 90471 IMMUNIZATION ADMIN: CPT | Performed by: EMERGENCY MEDICINE

## 2024-11-02 PROCEDURE — 6370000000 HC RX 637 (ALT 250 FOR IP): Performed by: EMERGENCY MEDICINE

## 2024-11-02 PROCEDURE — 99284 EMERGENCY DEPT VISIT MOD MDM: CPT

## 2024-11-02 PROCEDURE — 6370000000 HC RX 637 (ALT 250 FOR IP)

## 2024-11-02 PROCEDURE — 73130 X-RAY EXAM OF HAND: CPT

## 2024-11-02 PROCEDURE — 12002 RPR S/N/AX/GEN/TRNK2.6-7.5CM: CPT

## 2024-11-02 RX ORDER — OXYCODONE AND ACETAMINOPHEN 5; 325 MG/1; MG/1
1 TABLET ORAL ONCE
Status: COMPLETED | OUTPATIENT
Start: 2024-11-02 | End: 2024-11-02

## 2024-11-02 RX ORDER — BACITRACIN ZINC 500 [USP'U]/G
OINTMENT TOPICAL ONCE
Status: DISCONTINUED | OUTPATIENT
Start: 2024-11-02 | End: 2024-11-02

## 2024-11-02 RX ORDER — LIDOCAINE HYDROCHLORIDE 10 MG/ML
INJECTION, SOLUTION INFILTRATION; PERINEURAL
Status: COMPLETED
Start: 2024-11-02 | End: 2024-11-02

## 2024-11-02 RX ORDER — LIDOCAINE HYDROCHLORIDE 10 MG/ML
5 INJECTION, SOLUTION INFILTRATION; PERINEURAL ONCE
Status: COMPLETED | OUTPATIENT
Start: 2024-11-02 | End: 2024-11-02

## 2024-11-02 RX ORDER — TRAMADOL HYDROCHLORIDE 50 MG/1
50 TABLET ORAL EVERY 6 HOURS PRN
Qty: 8 TABLET | Refills: 0 | Status: SHIPPED | OUTPATIENT
Start: 2024-11-02 | End: 2024-11-03

## 2024-11-02 RX ORDER — CEPHALEXIN 500 MG/1
500 CAPSULE ORAL 4 TIMES DAILY
Qty: 28 CAPSULE | Refills: 0 | Status: SHIPPED | OUTPATIENT
Start: 2024-11-02 | End: 2024-11-09

## 2024-11-02 RX ADMIN — CLOSTRIDIUM TETANI TOXOID ANTIGEN (FORMALDEHYDE INACTIVATED) AND CORYNEBACTERIUM DIPHTHERIAE TOXOID ANTIGEN (FORMALDEHYDE INACTIVATED) 0.5 ML: 5; 2 INJECTION, SUSPENSION INTRAMUSCULAR at 19:58

## 2024-11-02 RX ADMIN — OXYCODONE AND ACETAMINOPHEN 1 TABLET: 5; 325 TABLET ORAL at 18:24

## 2024-11-02 RX ADMIN — CEPHALEXIN 500 MG: 250 CAPSULE ORAL at 19:59

## 2024-11-02 RX ADMIN — LIDOCAINE HYDROCHLORIDE 5 ML: 10 INJECTION, SOLUTION INFILTRATION; PERINEURAL at 22:44

## 2024-11-02 ASSESSMENT — LIFESTYLE VARIABLES
HOW MANY STANDARD DRINKS CONTAINING ALCOHOL DO YOU HAVE ON A TYPICAL DAY: PATIENT DOES NOT DRINK
HOW OFTEN DO YOU HAVE A DRINK CONTAINING ALCOHOL: NEVER

## 2024-11-02 ASSESSMENT — PAIN - FUNCTIONAL ASSESSMENT: PAIN_FUNCTIONAL_ASSESSMENT: 0-10

## 2024-11-02 ASSESSMENT — PAIN SCALES - GENERAL: PAINLEVEL_OUTOF10: 10

## 2024-11-02 NOTE — ED PROVIDER NOTES
[facility] EMERGENCY DEPARTMENT  EMERGENCY DEPARTMENT ENCOUNTER        Pt Name: Marbella Gardner  MRN: [id number]  Birthdate 1975  Date of evaluation: 11/2/2024  Provider: Vivek Kunz MD  PCP: Kalpesh Chen DO  Note Started: 7:41 PM EDT 11/2/24    CHIEF COMPLAINT & HISTORY     Chief Complaint   Patient presents with    Laceration     R hand/fingers         History From: pt  Marbella Cortes is an otherwise healthy 49-year-old female who presents for a laceration of her right hand.  At 1730 she says she was mowing when she saw that there was grass building up around the deck of the mower, she reached down and caught her fingers in the PTO blade.  The PTO shut down quickly as she was leaning over.  When she pulled her hand out she could not use her right ring finger, and she saw 3 primary lacerations 1 to her pinky 1 to her ring finger and went to her middle finger.  Her hand was covered in grease at that time.  She came to the hospital afterwards.  She is not on blood thinners.        Medical Decision Making/Differential Diagnosis/ED Course:    CC/HPI Summary, Social Determinants of health, Records Reviewed, DDx, testing done/not done, ED Course, Reassessment, disposition considerations/shared decision making with patient, consults, disposition:      Is this patient to be included in the SEP-1 core measure? No Exclusion criteria - the patient is NOT to be included for SEP-1 Core Measure due to: Infection is not suspected  Unless otherwise noted in HPI below, patient denies fever, chills, headache, shortness of breath, chest pain, abdominal pain, nausea, vomiting, diarrhea, lightheadedness, dysuria, hematuria, hematochezia, and melena.    ED Course as of 11/02/24 2228   Sat Nov 02, 2024 2001 Spoke with orthopedic surgery resident Dr. Mckeon.  He recommends closing the skin and soft tissue lacerations, however no indication for emergent flexor tendon repair at the bedside in the

## 2024-11-03 RX ORDER — OXYCODONE AND ACETAMINOPHEN 5; 325 MG/1; MG/1
1 TABLET ORAL EVERY 6 HOURS PRN
Qty: 12 TABLET | Refills: 0 | Status: SHIPPED | OUTPATIENT
Start: 2024-11-03 | End: 2024-11-06

## 2024-11-03 NOTE — DISCHARGE INSTRUCTIONS
Return to the ER if you have heavy bleeding from the cut, redness spreading from the cut, pus draining from the cut, fever, numbness or weakness in your hands, or any other new or concerning symptoms.

## 2024-11-04 DIAGNOSIS — S56.221A: Primary | ICD-10-CM

## 2024-11-04 DIAGNOSIS — S51.001A: Primary | ICD-10-CM

## 2024-11-08 ENCOUNTER — TREATMENT (OUTPATIENT)
Dept: OCCUPATIONAL THERAPY | Age: 49
End: 2024-11-08

## 2024-11-08 DIAGNOSIS — S66.829A LACERATION OF OTHER SPECIFIED MUSCLES, FASCIA AND TENDONS AT WRIST AND HAND LEVEL, UNSPECIFIED HAND, INITIAL ENCOUNTER: Primary | ICD-10-CM

## 2024-11-08 DIAGNOSIS — S64.40XA INJURY OF DIGITAL NERVE OF UNSPECIFIED FINGER, INITIAL ENCOUNTER: ICD-10-CM

## 2024-11-08 DIAGNOSIS — S61.409A UNSPECIFIED OPEN WOUND OF UNSPECIFIED HAND, INITIAL ENCOUNTER: ICD-10-CM

## 2024-11-08 NOTE — PROGRESS NOTES
OCCUPATIONAL THERAPY EVALUATION/Splint Application Only      Date:  2024      Patient Name:  Marbella Gardner    :  1975    Restrictions/Precautions:  Per Flexor Tendon Repair Protocol, low fall risk  Diagnosis: R MF Digital nerve Repair, RF FDS & FDP with Digital Nerve Repairs, & SF FDP and Digital Nerve Repair   [S66.829A] - Laceration of other specified muscles, fascia, and tendons at wrist and hand level, unspecified  [S64.40XA] - Injury of digital nerve of unspecified finger, initial encounter  [S68.409A] - Unspecified open wound of unspecified hand, initial encounter   Date of Surgery/Injury: 2024 sx    Insurance/Certification information:  [place]  Plan of care signed (Y/N): N  Visit# / total visits:     Referring Practitioner:  Dr. Brian Plunkett DO  Specific Practitioner Orders: Flexor Tendon Repair Protocol. Splint Needed: Dorsal blocking. PROM AAROM, AROM, stretching, scar management, strengthening, and modalities PRN.    Past Medical History:   Past Medical History:   Diagnosis Date    Eczema     Headache     Migraine     Motion sickness      Past Surgical History:   Past Surgical History:   Procedure Laterality Date    LYMPH NODE BIOPSY         Reason for Referral: Pt is a pleasant 49 year old female presenting to outpatient occupational therapy s/p R RF FDS & FDP with Digital Nerve Repairs On 2024. This injury is a result of an accident with the lawnmower. Pt presents today for splint fabrication only. OT evaluation for further treatment will be completed on 2024.    Splint Fabricated/Provided: Fabricated pt a forearm-based dorsal blocking splint, MCP's positioned at about 60-65* with IP's in extension and wrist flexed to 25*. Educated on the wear and care of the splint and assured proper don/doff application. Pt is to wear at all times and may remove 1-2x/day for hygiene while maintaining protected position.     Education Provided: Patient was provided with verbal

## 2024-11-11 ENCOUNTER — EVALUATION (OUTPATIENT)
Dept: OCCUPATIONAL THERAPY | Age: 49
End: 2024-11-11

## 2024-11-11 DIAGNOSIS — S64.40XA INJURY OF DIGITAL NERVE OF UNSPECIFIED FINGER, INITIAL ENCOUNTER: ICD-10-CM

## 2024-11-11 DIAGNOSIS — S66.829A LACERATION OF OTHER SPECIFIED MUSCLES, FASCIA AND TENDONS AT WRIST AND HAND LEVEL, UNSPECIFIED HAND, INITIAL ENCOUNTER: Primary | ICD-10-CM

## 2024-11-11 DIAGNOSIS — S61.409A UNSPECIFIED OPEN WOUND OF UNSPECIFIED HAND, INITIAL ENCOUNTER: ICD-10-CM

## 2024-11-11 NOTE — PROGRESS NOTES
Plans/Goals, Risks/Benefits discussed  [x] Home exercise program  Method of Education: [x] Verbal  [x] Demo  [] Written  Comprehension of Education:  [x] Verbalizes understanding.  [x] Demonstrates understanding.  [] Needs Review.  [] Demonstrates/verbalizes understanding of HEP/Ed previously given.        Patient understands diagnosis/prognosis and consents to treatment, plan and goals: [x] Yes    [] No       Time In: 1000            Time Out: 1100                      Timed Code Treatment Minutes: 20 minutes  CODE  Minutes  Units   74804 OT Eval Low     77031 OT Eval Medium 40 1   78564 OT Eval High     05106 Fluidotherapy     69293 Manual     14703 Therapeutic Ex     71172 Therapeutic Activity 10 1   29405 ADL/COMP Tech Train     79544 Neuromuscular Re-Ed     91326 OrthoManagementTraining     99693 Paraffin     84230 Electrical Stim - Attended     23979 Iontophoresis     15758 Ultrasound     25873 Other: splint adjustments 10 1              Electronically signed by: Anna Marie Hagan OT/LANE  [id number]      Physician's Certification / Comments      Frequency/Duration 2x / week for up to 16 visits.   Certification period From: 11-11-24  To: 2-11-25     I have reviewed the Plan of Care established for skilled therapy services and certify that the services are required and that they will be provided while the patient is under my care.     Practitioner's Comments/Revisions:           Practitioner's Printed Name:  Dr Brian Plunkett                                   Practitioner's Signature:                                                               Date:      Please review Patient's OT evaluation and if you agree sign/date and fax back to us at our [facility] OT Fax: [phone]. Thank you for your referral!

## 2024-11-13 ENCOUNTER — TREATMENT (OUTPATIENT)
Dept: OCCUPATIONAL THERAPY | Age: 49
End: 2024-11-13

## 2024-11-13 DIAGNOSIS — S64.40XA INJURY OF DIGITAL NERVE OF UNSPECIFIED FINGER, INITIAL ENCOUNTER: ICD-10-CM

## 2024-11-13 DIAGNOSIS — S61.409A UNSPECIFIED OPEN WOUND OF UNSPECIFIED HAND, INITIAL ENCOUNTER: ICD-10-CM

## 2024-11-13 DIAGNOSIS — S66.829A LACERATION OF OTHER SPECIFIED MUSCLES, FASCIA AND TENDONS AT WRIST AND HAND LEVEL, UNSPECIFIED HAND, INITIAL ENCOUNTER: Primary | ICD-10-CM

## 2024-11-13 NOTE — PROGRESS NOTES
OCCUPATIONAL THERAPY DAILY NOTE  [facility] PHYSICIANS [place] SPECIALTY [facility] OCCUPATIONAL THERAPY   [address]  [place] [address]  Dept: [phone]  Loc: [phone]   [facility] OT Fax: [phone]      Date:  2024  Initial Evaluation Date: 24     Evaluating Therapist: Anna Marie Hagan OT    Patient Name:  Marbella Gardner    :  1975  Restrictions/Precautions:  Per Flexor Tendon Repair Protocol, low fall risk  Diagnosis: R MF Digital nerve Repair, RF FDS & FDP with Digital Nerve Repairs, & SF FDP and Digital Nerve Repair             [S66.829A] - Laceration of other specified muscles, fascia, and tendons at wrist and hand level, unspecified  [S64.40XA] - Injury of digital nerve of unspecified finger, initial encounter  [S61.409A] - Unspecified open wound of unspecified hand, initial encounter            Date of Surgery/Injury: 2024 sx     Insurance/Certification information:  [place]  Plan of care signed (Y/N): N  Visit# / total visits: -16 visits     Referring Practitioner:  Dr. Brian Plunkett DO  Specific Practitioner Orders: Flexor Tendon Repair Protocol. Splint Needed: Dorsal blocking. PROM AAROM, AROM, stretching, scar management, strengthening, and modalities PRN.     Assessment of current deficits   [] Functional mobility             [x] ADLs           [x] Strength                  [] Cognition   [] Functional transfers           [x] IADLs          [] Safety Awareness  [] Endurance   [x] Fine Motor Coordination    [] Balance      [] Vision/perception    [x] Sensation     [x] Gross Motor Coordination [x] ROM           [x] Pain                        [x] Edema          [x] Scar Adhesion/Skin Integrity      OT PLAN OF CARE   OT POC based on physician orders, patient diagnosis and results of clinical assessment     Frequency/Duration: 2x/ week x 6-8 weeks     Specific OT Treatment to include:   [x] Instruction in HEP                   Modalities:  [x]

## 2024-11-15 ENCOUNTER — TREATMENT (OUTPATIENT)
Dept: OCCUPATIONAL THERAPY | Age: 49
End: 2024-11-15

## 2024-11-15 DIAGNOSIS — S66.829A LACERATION OF OTHER SPECIFIED MUSCLES, FASCIA AND TENDONS AT WRIST AND HAND LEVEL, UNSPECIFIED HAND, INITIAL ENCOUNTER: Primary | ICD-10-CM

## 2024-11-15 DIAGNOSIS — S61.409A UNSPECIFIED OPEN WOUND OF UNSPECIFIED HAND, INITIAL ENCOUNTER: ICD-10-CM

## 2024-11-15 DIAGNOSIS — S64.40XA INJURY OF DIGITAL NERVE OF UNSPECIFIED FINGER, INITIAL ENCOUNTER: ICD-10-CM

## 2024-11-15 NOTE — PROGRESS NOTES
OCCUPATIONAL THERAPY DAILY NOTE  [facility] PHYSICIANS [place] SPECIALTY [facility] OCCUPATIONAL THERAPY   [address]  [place] [address]  Dept: [phone]  Loc: [phone]   [facility] OT Fax: [phone]      Date:  11/15/2024  Initial Evaluation Date: 24     Evaluating Therapist: Anna Marie Hagan OT    Patient Name:  Marbella Gardner    :  1975  Restrictions/Precautions:  Per Flexor Tendon Repair Protocol, low fall risk  Diagnosis: R MF Digital nerve Repair, RF FDS & FDP with Digital Nerve Repairs, & SF FDP and Digital Nerve Repair             [S66.829A] - Laceration of other specified muscles, fascia, and tendons at wrist and hand level, unspecified  [S64.40XA] - Injury of digital nerve of unspecified finger, initial encounter  [S61.409A] - Unspecified open wound of unspecified hand, initial encounter            Date of Surgery/Injury: 2024 sx     Insurance/Certification information:  [place]  Plan of care signed (Y/N): N  Visit# / total visits: 3 / 12-16 visits     Referring Practitioner:  Dr. Brian Plunkett DO  Specific Practitioner Orders: Flexor Tendon Repair Protocol. Splint Needed: Dorsal blocking. PROM AAROM, AROM, stretching, scar management, strengthening, and modalities PRN.     Assessment of current deficits   [] Functional mobility             [x] ADLs           [x] Strength                  [] Cognition   [] Functional transfers           [x] IADLs          [] Safety Awareness  [] Endurance   [x] Fine Motor Coordination    [] Balance      [] Vision/perception    [x] Sensation     [x] Gross Motor Coordination [x] ROM           [x] Pain                        [x] Edema          [x] Scar Adhesion/Skin Integrity      OT PLAN OF CARE   OT POC based on physician orders, patient diagnosis and results of clinical assessment     Frequency/Duration: 2x/ week x 6-8 weeks     Specific OT Treatment to include:   [x] Instruction in HEP                   Modalities:  [x]

## 2024-11-18 ENCOUNTER — TREATMENT (OUTPATIENT)
Dept: OCCUPATIONAL THERAPY | Age: 49
End: 2024-11-18

## 2024-11-18 DIAGNOSIS — S66.829A LACERATION OF OTHER SPECIFIED MUSCLES, FASCIA AND TENDONS AT WRIST AND HAND LEVEL, UNSPECIFIED HAND, INITIAL ENCOUNTER: Primary | ICD-10-CM

## 2024-11-18 DIAGNOSIS — S61.409A UNSPECIFIED OPEN WOUND OF UNSPECIFIED HAND, INITIAL ENCOUNTER: ICD-10-CM

## 2024-11-18 DIAGNOSIS — S64.40XA INJURY OF DIGITAL NERVE OF UNSPECIFIED FINGER, INITIAL ENCOUNTER: ICD-10-CM

## 2024-11-18 NOTE — PROGRESS NOTES
OCCUPATIONAL THERAPY DAILY NOTE  [facility] PHYSICIANS [place] SPECIALTY [facility] OCCUPATIONAL THERAPY   [address]  [place] [address]  Dept: [phone]  Loc: [phone]   [facility] OT Fax: [phone]      Date:  2024  Initial Evaluation Date: 24     Evaluating Therapist: Anna Marie Hagan OT    Patient Name:  Marbella Gardner    :  1975  Restrictions/Precautions:  Per Flexor Tendon Repair Protocol, low fall risk  Diagnosis: R MF Digital nerve Repair, RF FDS & FDP with Digital Nerve Repairs, & SF FDP and Digital Nerve Repair             [S66.829A] - Laceration of other specified muscles, fascia, and tendons at wrist and hand level, unspecified  [S64.40XA] - Injury of digital nerve of unspecified finger, initial encounter  [S61.409A] - Unspecified open wound of unspecified hand, initial encounter            Date of Surgery/Injury: 2024 sx     Insurance/Certification information:  [place]  Plan of care signed (Y/N): N  Visit# / total visits: -16 visits     Referring Practitioner:  Dr. Biran Plunkett DO  Specific Practitioner Orders: Flexor Tendon Repair Protocol. Splint Needed: Dorsal blocking. PROM AAROM, AROM, stretching, scar management, strengthening, and modalities PRN.     Assessment of current deficits   [] Functional mobility             [x] ADLs           [x] Strength                  [] Cognition   [] Functional transfers           [x] IADLs          [] Safety Awareness  [] Endurance   [x] Fine Motor Coordination    [] Balance      [] Vision/perception    [x] Sensation     [x] Gross Motor Coordination [x] ROM           [x] Pain                        [x] Edema          [x] Scar Adhesion/Skin Integrity      OT PLAN OF CARE   OT POC based on physician orders, patient diagnosis and results of clinical assessment     Frequency/Duration: 2x/ week x 6-8 weeks     Specific OT Treatment to include:   [x] Instruction in HEP                   Modalities:  [x]

## 2024-11-20 ENCOUNTER — TREATMENT (OUTPATIENT)
Dept: OCCUPATIONAL THERAPY | Age: 49
End: 2024-11-20

## 2024-11-20 ENCOUNTER — OFFICE VISIT (OUTPATIENT)
Dept: FAMILY MEDICINE CLINIC | Age: 49
End: 2024-11-20
Payer: COMMERCIAL

## 2024-11-20 VITALS
HEIGHT: 67 IN | SYSTOLIC BLOOD PRESSURE: 110 MMHG | RESPIRATION RATE: 16 BRPM | TEMPERATURE: 98.4 F | HEART RATE: 82 BPM | DIASTOLIC BLOOD PRESSURE: 68 MMHG | WEIGHT: 152.4 LBS | BODY MASS INDEX: 23.92 KG/M2 | OXYGEN SATURATION: 98 %

## 2024-11-20 DIAGNOSIS — S66.829A LACERATION OF OTHER SPECIFIED MUSCLES, FASCIA AND TENDONS AT WRIST AND HAND LEVEL, UNSPECIFIED HAND, INITIAL ENCOUNTER: Primary | ICD-10-CM

## 2024-11-20 DIAGNOSIS — S64.40XA INJURY OF DIGITAL NERVE OF UNSPECIFIED FINGER, INITIAL ENCOUNTER: ICD-10-CM

## 2024-11-20 DIAGNOSIS — S61.409A UNSPECIFIED OPEN WOUND OF UNSPECIFIED HAND, INITIAL ENCOUNTER: ICD-10-CM

## 2024-11-20 DIAGNOSIS — G47.00 INSOMNIA, UNSPECIFIED TYPE: Primary | ICD-10-CM

## 2024-11-20 PROCEDURE — 99213 OFFICE O/P EST LOW 20 MIN: CPT | Performed by: NURSE PRACTITIONER

## 2024-11-20 RX ORDER — CALCIUM CARBONATE/VITAMIN D3 600 MG-10
TABLET ORAL
COMMUNITY

## 2024-11-20 RX ORDER — MIRTAZAPINE 7.5 MG/1
TABLET, FILM COATED ORAL
Qty: 30 TABLET | Refills: 0 | Status: SHIPPED | OUTPATIENT
Start: 2024-11-20

## 2024-11-20 SDOH — ECONOMIC STABILITY: FOOD INSECURITY: WITHIN THE PAST 12 MONTHS, THE FOOD YOU BOUGHT JUST DIDN'T LAST AND YOU DIDN'T HAVE MONEY TO GET MORE.: NEVER TRUE

## 2024-11-20 SDOH — ECONOMIC STABILITY: FOOD INSECURITY: WITHIN THE PAST 12 MONTHS, YOU WORRIED THAT YOUR FOOD WOULD RUN OUT BEFORE YOU GOT MONEY TO BUY MORE.: NEVER TRUE

## 2024-11-20 SDOH — ECONOMIC STABILITY: INCOME INSECURITY: HOW HARD IS IT FOR YOU TO PAY FOR THE VERY BASICS LIKE FOOD, HOUSING, MEDICAL CARE, AND HEATING?: NOT HARD AT ALL

## 2024-11-20 ASSESSMENT — ENCOUNTER SYMPTOMS
SHORTNESS OF BREATH: 0
CONSTIPATION: 1
WHEEZING: 0
NAUSEA: 1
VOMITING: 0
DIARRHEA: 0
COUGH: 0

## 2024-11-20 ASSESSMENT — PATIENT HEALTH QUESTIONNAIRE - PHQ9
SUM OF ALL RESPONSES TO PHQ9 QUESTIONS 1 & 2: 0
2. FEELING DOWN, DEPRESSED OR HOPELESS: NOT AT ALL
SUM OF ALL RESPONSES TO PHQ QUESTIONS 1-9: 0
1. LITTLE INTEREST OR PLEASURE IN DOING THINGS: NOT AT ALL
SUM OF ALL RESPONSES TO PHQ QUESTIONS 1-9: 0

## 2024-11-20 NOTE — PROGRESS NOTES
Marbella Gardner (:  1975) is a 49 y.o. female,Established patient, here for evaluation of the following chief complaint(s):  Sleep Problem (Not able to sleep )      Assessment & Plan   ASSESSMENT/PLAN:  1. Insomnia, unspecified type  -     mirtazapine (REMERON) 7.5 MG tablet; Take 1-2 tabs at bedtime as needed for insomnia, Disp-30 tablet, R-0Normal  Stop trazodone  Contact office if symptoms do not improve as expected or worsen.      No follow-ups on file.         Subjective   SUBJECTIVE/OBJECTIVE:  Complains of insomnia.  Difficulty staying asleep.  She tried increasing the trazodone dose but had nausea, RLS.  She was taking melatonin and benadryl but had constipation.         Review of Systems   Constitutional:  Positive for activity change (due to hand injury). Negative for appetite change, fatigue and unexpected weight change.   Respiratory:  Negative for cough, shortness of breath and wheezing.    Cardiovascular:  Negative for chest pain and palpitations.   Gastrointestinal:  Positive for constipation (due to pain medication) and nausea (with migraines). Negative for diarrhea and vomiting.   Neurological:  Positive for headaches (migraines). Negative for weakness and light-headedness.   Psychiatric/Behavioral:  Positive for sleep disturbance. Negative for dysphoric mood. The patient is not nervous/anxious.           Objective   /68   Pulse 82   Temp 98.4 °F (36.9 °C)   Resp 16   Ht 1.71 m (5' 7.32\")   Wt 69.1 kg (152 lb 6.4 oz)   LMP 2024 (Exact Date)   SpO2 98%   BMI 23.64 kg/m²    Physical Exam  Constitutional:       General: She is not in acute distress.     Appearance: Normal appearance. She is well-developed.   HENT:      Head: Normocephalic and atraumatic.   Neck:      Thyroid: No thyromegaly.      Trachea: No tracheal deviation.   Cardiovascular:      Rate and Rhythm: Normal rate and regular rhythm.      Heart sounds: Normal heart sounds. No murmur heard.  Pulmonary:

## 2024-11-20 NOTE — PROGRESS NOTES
OCCUPATIONAL THERAPY DAILY NOTE  [facility] PHYSICIANS [place] SPECIALTY [facility] OCCUPATIONAL THERAPY   [address]  [place] [address]  Dept: [phone]  Loc: [phone]   [facility] OT Fax: [phone]      Date:  2024  Initial Evaluation Date: 24     Evaluating Therapist: Anna Marie Hagan OT    Patient Name:  Marbella Gardner    :  1975  Restrictions/Precautions:  Per Flexor Tendon Repair Protocol, low fall risk  Diagnosis: R MF Digital nerve Repair, RF FDS & FDP with Digital Nerve Repairs, & SF FDP and Digital Nerve Repair             [S66.829A] - Laceration of other specified muscles, fascia, and tendons at wrist and hand level, unspecified  [S64.40XA] - Injury of digital nerve of unspecified finger, initial encounter  [S61.409A] - Unspecified open wound of unspecified hand, initial encounter            Date of Surgery/Injury: 2024 sx     Insurance/Certification information:  [place]  Plan of care signed (Y/N): N  Visit# / total visits: -16 visits     Referring Practitioner:  Dr. Brian Plunkett DO  Specific Practitioner Orders: Flexor Tendon Repair Protocol. Splint Needed: Dorsal blocking. PROM AAROM, AROM, stretching, scar management, strengthening, and modalities PRN.     Assessment of current deficits   [] Functional mobility             [x] ADLs           [x] Strength                  [] Cognition   [] Functional transfers           [x] IADLs          [] Safety Awareness  [] Endurance   [x] Fine Motor Coordination    [] Balance      [] Vision/perception    [x] Sensation     [x] Gross Motor Coordination [x] ROM           [x] Pain                        [x] Edema          [x] Scar Adhesion/Skin Integrity      OT PLAN OF CARE   OT POC based on physician orders, patient diagnosis and results of clinical assessment     Frequency/Duration: 2x/ week x 6-8 weeks     Specific OT Treatment to include:   [x] Instruction in HEP                   Modalities:  [x]

## 2024-11-25 ENCOUNTER — TREATMENT (OUTPATIENT)
Dept: OCCUPATIONAL THERAPY | Age: 49
End: 2024-11-25
Payer: OTHER GOVERNMENT

## 2024-11-25 DIAGNOSIS — S61.409A UNSPECIFIED OPEN WOUND OF UNSPECIFIED HAND, INITIAL ENCOUNTER: ICD-10-CM

## 2024-11-25 DIAGNOSIS — S66.829A LACERATION OF OTHER SPECIFIED MUSCLES, FASCIA AND TENDONS AT WRIST AND HAND LEVEL, UNSPECIFIED HAND, INITIAL ENCOUNTER: Primary | ICD-10-CM

## 2024-11-25 DIAGNOSIS — S64.40XA INJURY OF DIGITAL NERVE OF UNSPECIFIED FINGER, INITIAL ENCOUNTER: ICD-10-CM

## 2024-11-25 PROCEDURE — 97110 THERAPEUTIC EXERCISES: CPT | Performed by: OCCUPATIONAL THERAPIST

## 2024-11-25 PROCEDURE — 97140 MANUAL THERAPY 1/> REGIONS: CPT | Performed by: OCCUPATIONAL THERAPIST

## 2024-11-25 NOTE — PROGRESS NOTES
with a focus on initiation of gentle slight flexion at DIP/ PIPs and place and hold fisting. Good movement is present in the MF. ROM at the PIPs of the RF/SF is emerging with slight active flexion the the SF DIP. Will continue to progress movement per protocol and start scar massage and excessive skin is removed.    -Rehab Potential: Good   -Patient Response to Treatment: Pt is motivated for recovery.    Patient. Education:  [x] Plans/Goals, Risks/Benefits discussed  [] Home exercise program  Method of Education: [x] Verbal  [x] Demo  [] Written  Comprehension of Education:  [x] Verbalizes understanding.  [x] Demonstrates understanding.  [] Needs Review.  [] Demonstrates/verbalizes understanding of HEP/Ed previously given.      Time In: 1300           Time Out: 1350         CODE  Minutes  Units   71557 Fluidotherapy     26331 Paraffin     30287 Ultrasound     93369 Electrical Stim - Attended     64035 Iontophoresis     88850 Therapeutic Ex 20 1   50215 Therapeutic Activity     06467 Neuromuscular Re-Ed     74527 Manual Therapy 25 2   66617 ADL/COMP Tech Train     87459 Orthotic Management/Training      Other:  5 0               Total  50 3       Plan: OT 2x/week for up to 16 sessions    [x]  Continue Plan of care with focus on protective ROM, edema control,desensitization,  splinting, pain mgmt : Treatment covered based on POC and graduated to patient's progress. Pt education continues at each visit to obtain maximum benefits from skilled OT intervention.  []  Alter Plan of care:   []  Discharge:      Anna Marie Hagan OT /LANE  [id number]

## 2024-12-04 ENCOUNTER — TREATMENT (OUTPATIENT)
Dept: OCCUPATIONAL THERAPY | Age: 49
End: 2024-12-04

## 2024-12-04 DIAGNOSIS — S64.40XA INJURY OF DIGITAL NERVE OF UNSPECIFIED FINGER, INITIAL ENCOUNTER: ICD-10-CM

## 2024-12-04 DIAGNOSIS — S61.409A UNSPECIFIED OPEN WOUND OF UNSPECIFIED HAND, INITIAL ENCOUNTER: ICD-10-CM

## 2024-12-04 DIAGNOSIS — S66.829A LACERATION OF OTHER SPECIFIED MUSCLES, FASCIA AND TENDONS AT WRIST AND HAND LEVEL, UNSPECIFIED HAND, INITIAL ENCOUNTER: Primary | ICD-10-CM

## 2024-12-04 NOTE — PROGRESS NOTES
OCCUPATIONAL THERAPY DAILY NOTE  [facility] PHYSICIANS [place] SPECIALTY [facility] OCCUPATIONAL THERAPY   [address]  [place] [address]  Dept: [phone]  Loc: [phone]   [facility] OT Fax: [phone]      Date:  2024  Initial Evaluation Date: 24     Evaluating Therapist: Anna Marie Hagan OT    Patient Name:  Marbella Gardner    :  1975  Restrictions/Precautions:  Per Flexor Tendon Repair Protocol, low fall risk  Diagnosis: R MF Digital nerve Repair, RF FDS & FDP with Digital Nerve Repairs, & SF FDP and Digital Nerve Repair             [S66.829A] - Laceration of other specified muscles, fascia, and tendons at wrist and hand level, unspecified  [S64.40XA] - Injury of digital nerve of unspecified finger, initial encounter  [S61.409A] - Unspecified open wound of unspecified hand, initial encounter            Date of Surgery/Injury: 2024 sx     Insurance/Certification information:  [place]  Plan of care signed (Y/N): N  Visit# / total visits: -16 visits     Referring Practitioner:  Dr. Brian Plunkett DO  Specific Practitioner Orders: Flexor Tendon Repair Protocol. Splint Needed: Dorsal blocking. PROM AAROM, AROM, stretching, scar management, strengthening, and modalities PRN.     Assessment of current deficits   [] Functional mobility             [x] ADLs           [x] Strength                  [] Cognition   [] Functional transfers           [x] IADLs          [] Safety Awareness  [] Endurance   [x] Fine Motor Coordination    [] Balance      [] Vision/perception    [x] Sensation     [x] Gross Motor Coordination [x] ROM           [x] Pain                        [x] Edema          [x] Scar Adhesion/Skin Integrity      OT PLAN OF CARE   OT POC based on physician orders, patient diagnosis and results of clinical assessment     Frequency/Duration: 2x/ week x 6-8 weeks     Specific OT Treatment to include:   [x] Instruction in HEP                   Modalities:  [x]

## 2024-12-06 ENCOUNTER — TREATMENT (OUTPATIENT)
Dept: OCCUPATIONAL THERAPY | Age: 49
End: 2024-12-06
Payer: COMMERCIAL

## 2024-12-06 DIAGNOSIS — S64.40XA INJURY OF DIGITAL NERVE OF UNSPECIFIED FINGER, INITIAL ENCOUNTER: ICD-10-CM

## 2024-12-06 DIAGNOSIS — S66.829A LACERATION OF OTHER SPECIFIED MUSCLES, FASCIA AND TENDONS AT WRIST AND HAND LEVEL, UNSPECIFIED HAND, INITIAL ENCOUNTER: Primary | ICD-10-CM

## 2024-12-06 DIAGNOSIS — S61.409A UNSPECIFIED OPEN WOUND OF UNSPECIFIED HAND, INITIAL ENCOUNTER: ICD-10-CM

## 2024-12-06 PROCEDURE — 97110 THERAPEUTIC EXERCISES: CPT | Performed by: OCCUPATIONAL THERAPIST

## 2024-12-06 PROCEDURE — 97022 WHIRLPOOL THERAPY: CPT | Performed by: OCCUPATIONAL THERAPIST

## 2024-12-06 PROCEDURE — 97140 MANUAL THERAPY 1/> REGIONS: CPT | Performed by: OCCUPATIONAL THERAPIST

## 2024-12-06 NOTE — PROGRESS NOTES
100% compliance with their splint wear, care, and precautions if needed.   7) Patient to demonstrate decreased guarding of their affected extremity from 100% to 20% or less. .   8) Patient will demonstrate a non-tender/non-adherent scar.   9) Patient will decrease QuickDASH score to 30% or less for increased participation in daily functional activities.        TODAY'S TREATMENT     Pain Level: 4-5/10 discomfort in the hand.   Subjective: Pt reports her hand feels more uncomfortable and stiff today.  Objective:    Updated POC to be completed by visit 10.    INTERVENTION: COMPLETED: SPECIFICS/COMMENTS:   Modality:     MH x hand/ wrist x - 5 min at Tx start to decrease jopint stiffness   Fluidotherapy  - 10 min with gentle AROM   ROM: protective position     R hand  x - passive flexion/ protective extension/ composite and each digit     X    X        X  x - tenodesis/ relaxed wrist flexion w/ passive digital flexion with wrist in neutral  - IF blocking ex at DIP and at PIP- other fingers blocked into splint  - Opposition to the thumb  - IF slide down the thumb  - active fingertip curls to level 1/ start at DIP  - modified tenodesis with brief hold in flexion   AROM/ AAROM     Hand X  X  x  X  X   - active finger tip curls  - isolated PIPand DIP place and hold flexion each digit  - alternating opposition  - gentle blocking ex at PIPs and at DIPs  -  and place small ball with all digits in flexion        PROM/Stretching:      X    X    x - passive stretches to DIPs each digit, PIPs and composite flexion  - gentle PIP extension stretch RF -SF/ in protective position  - thumb PROM all planes--> attn ABD        Scar Mass/Edema Control:     Soft tissue mob/ desensitization x - palmar massage   Edema control x - digital compression  - Retrograde massage digits/ hand   Scar mgmt X  x   - manual scar massage as tolerated  - skin integrity is getting better/ less excessive skin   Strengthening:               Other:

## 2024-12-11 ENCOUNTER — TREATMENT (OUTPATIENT)
Dept: OCCUPATIONAL THERAPY | Age: 49
End: 2024-12-11

## 2024-12-11 DIAGNOSIS — S64.40XA INJURY OF DIGITAL NERVE OF UNSPECIFIED FINGER, INITIAL ENCOUNTER: ICD-10-CM

## 2024-12-11 DIAGNOSIS — S66.829A LACERATION OF OTHER SPECIFIED MUSCLES, FASCIA AND TENDONS AT WRIST AND HAND LEVEL, UNSPECIFIED HAND, INITIAL ENCOUNTER: Primary | ICD-10-CM

## 2024-12-11 DIAGNOSIS — S61.409A UNSPECIFIED OPEN WOUND OF UNSPECIFIED HAND, INITIAL ENCOUNTER: ICD-10-CM

## 2024-12-11 NOTE — PROGRESS NOTES
100% compliance with their splint wear, care, and precautions if needed.   7) Patient to demonstrate decreased guarding of their affected extremity from 100% to 20% or less. .   8) Patient will demonstrate a non-tender/non-adherent scar.   9) Patient will decrease QuickDASH score to 30% or less for increased participation in daily functional activities.        TODAY'S TREATMENT     Pain Level: No pain at Tx start. 4-5/10 discomfort in the hand.   Subjective: Pt states her hand ahs been stiffObjective:    Updated POC to be completed by visit 10.    INTERVENTION: COMPLETED: SPECIFICS/COMMENTS:   Modality:     MH x hand/ wrist x - 5 min at Tx start to decrease joint stiffness due to healing wound on SF/ light serous drainage   Fluidotherapy  - 10 min with gentle AROM   ROM:     AROM/ AAROM     Hand X  X  x  X    x   - active finger tip curls with red dowel  - isolated PIPand DIP place and hold flexion each digit  - alternating opposition  - gentle blocking ex at PIPs and at DIPs  -  and place small ball with all digits in flexion  -  and place buttons using alternating prehension   Wrist  - tenodesis   PROM/Stretching:      X    X    X  x - passive stretches to DIPs each digit, PIPs and composite flexion  - intrinsic hand stretches  - gentle PIP extension stretch RF -SF with rolling on dowel  - gentle stretches for digital abduction  - thumb PROM all planes--> attn ABD        Scar Mass/Edema Control:     Soft tissue mob/ desensitization x - palmar massage   Edema control x - digital compression  - Retrograde massage digits/ hand   Scar mgmt X    x - manual scar massage as tolerated including graston as tolerated  - skin integrity is getting better/ less excessive skin   Strengthening:               Other:     Splint check  - minor adjustment in trough to further widen radial border--helped   Wound checks x - doing well/ no active bleeding or seepage          Assessment/Comments: Pt presents with continued

## 2024-12-13 ENCOUNTER — TREATMENT (OUTPATIENT)
Dept: OCCUPATIONAL THERAPY | Age: 49
End: 2024-12-13
Payer: COMMERCIAL

## 2024-12-13 DIAGNOSIS — S66.829A LACERATION OF OTHER SPECIFIED MUSCLES, FASCIA AND TENDONS AT WRIST AND HAND LEVEL, UNSPECIFIED HAND, INITIAL ENCOUNTER: Primary | ICD-10-CM

## 2024-12-13 DIAGNOSIS — S61.409A UNSPECIFIED OPEN WOUND OF UNSPECIFIED HAND, INITIAL ENCOUNTER: ICD-10-CM

## 2024-12-13 DIAGNOSIS — S64.40XA INJURY OF DIGITAL NERVE OF UNSPECIFIED FINGER, INITIAL ENCOUNTER: ICD-10-CM

## 2024-12-13 PROCEDURE — 97110 THERAPEUTIC EXERCISES: CPT | Performed by: OCCUPATIONAL THERAPIST

## 2024-12-13 PROCEDURE — 97018 PARAFFIN BATH THERAPY: CPT | Performed by: OCCUPATIONAL THERAPIST

## 2024-12-13 PROCEDURE — 97140 MANUAL THERAPY 1/> REGIONS: CPT | Performed by: OCCUPATIONAL THERAPIST

## 2024-12-13 NOTE — PROGRESS NOTES
100% compliance with their splint wear, care, and precautions if needed.   7) Patient to demonstrate decreased guarding of their affected extremity from 100% to 20% or less. .   8) Patient will demonstrate a non-tender/non-adherent scar.   9) Patient will decrease QuickDASH score to 30% or less for increased participation in daily functional activities.        TODAY'S TREATMENT     Pain Level: No pain at Tx start. 3-4/10 discomfort in the hand.   Subjective: Pt states she has been working on her hand feels it is better today.   Objective:    Updated POC to be completed by visit 10.    INTERVENTION: COMPLETED: SPECIFICS/COMMENTS:   Modality:     US x hand x - 5 min 3.3 MHz, 50%  .7 to decrease palmar dense scarring   Paraffin x - 10 min with gentle AROM   ROM:     AROM/ AAROM     Hand X  X  x  X       - active finger tip curls with red dowel  - isolated PIPand DIP place and hold flexion each digit  - alternating opposition  - blocking ex at PIPs and at DIPs  -  and place small ball with all digits in flexion  -  and place buttons using alternating prehension   Wrist  - tenodesis   PROM/Stretching:      X    X  X    x - passive stretches to DIPs each digit, PIPs and composite flexion  - intrinsic hand stretches  - gentle PIP extension stretch RF -SF with rolling on dowel  - gentle stretches for digital abduction  - thumb PROM all planes--> attn ABD        Scar Mass/Edema Control:     Soft tissue mob/ desensitization x - palmar massage   Edema control x - digital compression  - Retrograde massage digits/ hand   Scar mgmt X    x - manual scar massage as tolerated including graston as tolerated  - skin integrity is getting better/ less excessive skin   Strengthening:               Other:     Splint check  - minor adjustment in trough to further widen radial border--helped   Wound checks x - doing well/ no active bleeding or seepage          Assessment/Comments: ROM and stretches continued with an

## 2024-12-16 ENCOUNTER — TREATMENT (OUTPATIENT)
Dept: OCCUPATIONAL THERAPY | Age: 49
End: 2024-12-16
Payer: COMMERCIAL

## 2024-12-16 DIAGNOSIS — S66.829A LACERATION OF OTHER SPECIFIED MUSCLES, FASCIA AND TENDONS AT WRIST AND HAND LEVEL, UNSPECIFIED HAND, INITIAL ENCOUNTER: Primary | ICD-10-CM

## 2024-12-16 DIAGNOSIS — S64.40XA INJURY OF DIGITAL NERVE OF UNSPECIFIED FINGER, INITIAL ENCOUNTER: ICD-10-CM

## 2024-12-16 DIAGNOSIS — S61.409A UNSPECIFIED OPEN WOUND OF UNSPECIFIED HAND, INITIAL ENCOUNTER: ICD-10-CM

## 2024-12-16 PROCEDURE — 97110 THERAPEUTIC EXERCISES: CPT | Performed by: OCCUPATIONAL THERAPIST

## 2024-12-16 PROCEDURE — 97018 PARAFFIN BATH THERAPY: CPT | Performed by: OCCUPATIONAL THERAPIST

## 2024-12-16 PROCEDURE — 97140 MANUAL THERAPY 1/> REGIONS: CPT | Performed by: OCCUPATIONAL THERAPIST

## 2024-12-16 NOTE — PROGRESS NOTES
100% compliance with their splint wear, care, and precautions if needed.   7) Patient to demonstrate decreased guarding of their affected extremity from 100% to 20% or less. .   8) Patient will demonstrate a non-tender/non-adherent scar.   9) Patient will decrease QuickDASH score to 30% or less for increased participation in daily functional activities.        TODAY'S TREATMENT     Pain Level: No pain at Tx start. 3-4/10 discomfort in the hand.   Subjective: Pt says her hand is tighter due to the rainy weather.  Objective:    Updated POC to be completed by visit 10.    INTERVENTION: COMPLETED: SPECIFICS/COMMENTS:   Modality:     US x hand x - 5 min 3.3 MHz, 50%  .7 to decrease palmar dense scarring   Paraffin x - 10 min with gentle AROM   ROM:     AROM/ AAROM     Hand   X    X       - active finger tip curls with red dowel  - isolated PIPand DIP place and hold flexion each digit  - alternating opposition  - blocking ex at PIPs and at DIPs  -  and place small ball with all digits in flexion  -  and place buttons using alternating prehension   Wrist   x - tenodesis  - Wrist Jux-a-ciser   PROM/Stretching:      X    X  X    X    x - passive stretches to DIPs each digit, PIPs and composite flexion  - intrinsic hand stretches  - gentle PIP extension stretch RF -SF with rolling on dowel  - gentle stretches for digital abduction  - thumb PROM all planes--> attn ABD  - Wrist PROM all planes        Scar Mass/Edema Control:     Soft tissue mob/ desensitization x - palmar massage   Edema control x - digital compression  - Retrograde massage digits/ hand   Scar mgmt X    x - manual scar massage as tolerated including graston as tolerated  - skin integrity is getting better/ less excessive skin   Strengthening:               Other:     Splint check  - minor adjustment in trough to further widen radial border--helped   Wound checks x - doing well/ no active bleeding or seepage          Assessment/Comments: ROM and

## 2024-12-17 DIAGNOSIS — G47.00 INSOMNIA, UNSPECIFIED TYPE: Primary | ICD-10-CM

## 2024-12-17 RX ORDER — TRAZODONE HYDROCHLORIDE 50 MG/1
50 TABLET, FILM COATED ORAL NIGHTLY PRN
Qty: 30 TABLET | Refills: 3 | Status: SHIPPED | OUTPATIENT
Start: 2024-12-17

## 2024-12-18 ENCOUNTER — TREATMENT (OUTPATIENT)
Dept: OCCUPATIONAL THERAPY | Age: 49
End: 2024-12-18

## 2024-12-18 DIAGNOSIS — S64.40XA INJURY OF DIGITAL NERVE OF UNSPECIFIED FINGER, INITIAL ENCOUNTER: ICD-10-CM

## 2024-12-18 DIAGNOSIS — S66.829A LACERATION OF OTHER SPECIFIED MUSCLES, FASCIA AND TENDONS AT WRIST AND HAND LEVEL, UNSPECIFIED HAND, INITIAL ENCOUNTER: Primary | ICD-10-CM

## 2024-12-18 DIAGNOSIS — S61.409A UNSPECIFIED OPEN WOUND OF UNSPECIFIED HAND, INITIAL ENCOUNTER: ICD-10-CM

## 2024-12-18 NOTE — PROGRESS NOTES
OCCUPATIONAL THERAPY DAILY NOTE  [facility] PHYSICIANS [place] SPECIALTY [facility] OCCUPATIONAL THERAPY   [address]  [place] [address]  Dept: [phone]  Loc: [phone]   [facility] OT Fax: [phone]      Date:  2024  Initial Evaluation Date: 24     Evaluating Therapist: Anna Marie Hagan OT    Patient Name:  Marbella Gardner    :  1975  Restrictions/Precautions:  Per Flexor Tendon Repair Protocol, low fall risk  Diagnosis: R MF Digital nerve Repair, RF FDS & FDP with Digital Nerve Repairs, & SF FDP and Digital Nerve Repair             [S66.829A] - Laceration of other specified muscles, fascia, and tendons at wrist and hand level, unspecified  [S64.40XA] - Injury of digital nerve of unspecified finger, initial encounter  [S61.409A] - Unspecified open wound of unspecified hand, initial encounter            Date of Surgery/Injury: 2024 sx     Insurance/Certification information:  [place]  Plan of care signed (Y/N): N  Visit# / total visits: 10 / 12-16 visits     Referring Practitioner:  Dr. Brian Plunkett DO  Specific Practitioner Orders: Flexor Tendon Repair Protocol. Splint Needed: Dorsal blocking. PROM AAROM, AROM, stretching, scar management, strengthening, and modalities PRN.     Assessment of current deficits   [] Functional mobility             [x] ADLs           [x] Strength                  [] Cognition   [] Functional transfers           [x] IADLs          [] Safety Awareness  [] Endurance   [x] Fine Motor Coordination    [] Balance      [] Vision/perception    [x] Sensation     [x] Gross Motor Coordination [x] ROM           [x] Pain                        [x] Edema          [x] Scar Adhesion/Skin Integrity      OT PLAN OF CARE   OT POC based on physician orders, patient diagnosis and results of clinical assessment     Frequency/Duration: 2x/ week x 6-8 weeks     Specific OT Treatment to include:   [x] Instruction in HEP                   Modalities:  [x]

## 2024-12-19 RX ORDER — TRAZODONE HYDROCHLORIDE 50 MG/1
50 TABLET, FILM COATED ORAL NIGHTLY
Qty: 90 TABLET | Refills: 1 | Status: SHIPPED | OUTPATIENT
Start: 2024-12-19

## 2024-12-19 NOTE — TELEPHONE ENCOUNTER
Pt evaluated for sleep disturbance and insomnia. Stable on meds. Plan : Continue             FOLLOW-UP:  zeyad

## 2024-12-19 NOTE — TELEPHONE ENCOUNTER
Name of Medication(s) Requested:  Requested Prescriptions     Pending Prescriptions Disp Refills    traZODone (DESYREL) 50 MG tablet 90 tablet 1     Sig: Take 1 tablet by mouth nightly       Medication is on current medication list Yes    Dosage and directions were verified? Yes    Quantity verified: 90 day supply     Pharmacy Verified?  Yes    Last Appointment:  11/20/2024    Future appts:  No future appointments.     (If no appt send self scheduling link. .REFILLAPPT)  Scheduling request sent?     [] Yes  [x] No    Does patient need updated?  [] Yes  [x] No

## 2024-12-27 ENCOUNTER — TREATMENT (OUTPATIENT)
Dept: OCCUPATIONAL THERAPY | Age: 49
End: 2024-12-27
Payer: COMMERCIAL

## 2024-12-27 DIAGNOSIS — S66.829A LACERATION OF OTHER SPECIFIED MUSCLES, FASCIA AND TENDONS AT WRIST AND HAND LEVEL, UNSPECIFIED HAND, INITIAL ENCOUNTER: Primary | ICD-10-CM

## 2024-12-27 DIAGNOSIS — S64.40XA INJURY OF DIGITAL NERVE OF UNSPECIFIED FINGER, INITIAL ENCOUNTER: ICD-10-CM

## 2024-12-27 DIAGNOSIS — S61.409A UNSPECIFIED OPEN WOUND OF UNSPECIFIED HAND, INITIAL ENCOUNTER: ICD-10-CM

## 2024-12-27 PROCEDURE — 97110 THERAPEUTIC EXERCISES: CPT | Performed by: OCCUPATIONAL THERAPIST

## 2024-12-27 PROCEDURE — 97018 PARAFFIN BATH THERAPY: CPT | Performed by: OCCUPATIONAL THERAPIST

## 2024-12-27 PROCEDURE — 97140 MANUAL THERAPY 1/> REGIONS: CPT | Performed by: OCCUPATIONAL THERAPIST

## 2024-12-27 NOTE — PROGRESS NOTES
OCCUPATIONAL THERAPY DAILY NOTE  [facility] PHYSICIANS [place] SPECIALTY [facility] OCCUPATIONAL THERAPY   [address]  [place] [address]  Dept: [phone]  Loc: [phone]   [facility] OT Fax: [phone]      Date:  2024  Initial Evaluation Date: 24     Evaluating Therapist: Anna Marie Hagan OT    Patient Name:  Marbella Gardner    :  1975  Restrictions/Precautions:  Per Flexor Tendon Repair Protocol, low fall risk  Diagnosis: R MF Digital nerve Repair, RF FDS & FDP with Digital Nerve Repairs, & SF FDP and Digital Nerve Repair             [S66.829A] - Laceration of other specified muscles, fascia, and tendons at wrist and hand level, unspecified  [S64.40XA] - Injury of digital nerve of unspecified finger, initial encounter  [S61.409A] - Unspecified open wound of unspecified hand, initial encounter            Date of Surgery/Injury: 2024 sx     Insurance/Certification information:  [place]  Plan of care signed (Y/N): N  Visit# / total visits: -16 visits     Referring Practitioner:  Dr. Brian Plunkett DO  Specific Practitioner Orders: Flexor Tendon Repair Protocol. Splint Needed: Dorsal blocking. PROM AAROM, AROM, stretching, scar management, strengthening, and modalities PRN.     Assessment of current deficits   [] Functional mobility             [x] ADLs           [x] Strength                  [] Cognition   [] Functional transfers           [x] IADLs          [] Safety Awareness  [] Endurance   [x] Fine Motor Coordination    [] Balance      [] Vision/perception    [x] Sensation     [x] Gross Motor Coordination [x] ROM           [x] Pain                        [x] Edema          [x] Scar Adhesion/Skin Integrity      OT PLAN OF CARE   OT POC based on physician orders, patient diagnosis and results of clinical assessment     Frequency/Duration: 2x/ week x 6-8 weeks     Specific OT Treatment to include:   [x] Instruction in HEP                   Modalities:  [x]

## 2024-12-28 ENCOUNTER — TELEPHONE (OUTPATIENT)
Dept: FAMILY MEDICINE CLINIC | Age: 49
End: 2024-12-28

## 2024-12-28 RX ORDER — CEPHALEXIN 500 MG/1
500 CAPSULE ORAL 3 TIMES DAILY
Qty: 30 CAPSULE | Refills: 0 | Status: SHIPPED | OUTPATIENT
Start: 2024-12-28 | End: 2025-01-07

## 2024-12-30 ENCOUNTER — TREATMENT (OUTPATIENT)
Dept: OCCUPATIONAL THERAPY | Age: 49
End: 2024-12-30
Payer: COMMERCIAL

## 2024-12-30 DIAGNOSIS — S66.829A LACERATION OF OTHER SPECIFIED MUSCLES, FASCIA AND TENDONS AT WRIST AND HAND LEVEL, UNSPECIFIED HAND, INITIAL ENCOUNTER: Primary | ICD-10-CM

## 2024-12-30 DIAGNOSIS — S64.40XA INJURY OF DIGITAL NERVE OF UNSPECIFIED FINGER, INITIAL ENCOUNTER: ICD-10-CM

## 2024-12-30 DIAGNOSIS — S61.409A UNSPECIFIED OPEN WOUND OF UNSPECIFIED HAND, INITIAL ENCOUNTER: ICD-10-CM

## 2024-12-30 PROCEDURE — 97110 THERAPEUTIC EXERCISES: CPT | Performed by: OCCUPATIONAL THERAPIST

## 2024-12-30 PROCEDURE — 97140 MANUAL THERAPY 1/> REGIONS: CPT | Performed by: OCCUPATIONAL THERAPIST

## 2024-12-30 PROCEDURE — 97018 PARAFFIN BATH THERAPY: CPT | Performed by: OCCUPATIONAL THERAPIST

## 2024-12-30 PROCEDURE — 97530 THERAPEUTIC ACTIVITIES: CPT | Performed by: OCCUPATIONAL THERAPIST

## 2024-12-30 NOTE — PROGRESS NOTES
splinting, pain mgmt . Will advance strengthening as tolerated.  : Treatment covered based on POC and graduated to patient's progress. Pt education continues at each visit to obtain maximum benefits from skilled OT intervention.  []  Alter Plan of care:   []  Discharge:      Anna Marie Hagan OT /L  [id number]

## 2025-01-03 ENCOUNTER — TREATMENT (OUTPATIENT)
Dept: OCCUPATIONAL THERAPY | Age: 50
End: 2025-01-03

## 2025-01-03 DIAGNOSIS — S66.829A LACERATION OF OTHER SPECIFIED MUSCLES, FASCIA AND TENDONS AT WRIST AND HAND LEVEL, UNSPECIFIED HAND, INITIAL ENCOUNTER: Primary | ICD-10-CM

## 2025-01-03 DIAGNOSIS — S64.40XA INJURY OF DIGITAL NERVE OF UNSPECIFIED FINGER, INITIAL ENCOUNTER: ICD-10-CM

## 2025-01-03 DIAGNOSIS — S61.409A UNSPECIFIED OPEN WOUND OF UNSPECIFIED HAND, INITIAL ENCOUNTER: ICD-10-CM

## 2025-01-03 NOTE — PROGRESS NOTES
OCCUPATIONAL THERAPY DAILY NOTE  [facility] PHYSICIANS [place] SPECIALTY [facility] OCCUPATIONAL THERAPY   [address] [place] [address]  Dept: [phone]  Loc: [phone]   [facility] OT Fax: [phone]      Date:  1/3/2025  Initial Evaluation Date: 24     Evaluating Therapist: Anna Marie Hagan OT    Patient Name:  Marbella Gardner    :  1975  Restrictions/Precautions:  Per Flexor Tendon Repair Protocol, low fall risk  Diagnosis: R MF Digital nerve Repair, RF FDS & FDP with Digital Nerve Repairs, & SF FDP and Digital Nerve Repair             [S66.829A] - Laceration of other specified muscles, fascia, and tendons at wrist and hand level, unspecified  [S64.40XA] - Injury of digital nerve of unspecified finger, initial encounter  [S61.409A] - Unspecified open wound of unspecified hand, initial encounter            Date of Surgery/Injury: 2024 sx     Insurance/Certification information:  [place]  Plan of care signed (Y/N): Y - signed electronically  Visit# / total visits: -16 visits     Referring Practitioner:  Dr. Brian Plunkett DO  Specific Practitioner Orders: Flexor Tendon Repair Protocol. Splint Needed: Dorsal blocking. PROM AAROM, AROM, stretching, scar management, strengthening, and modalities PRN.     Assessment of current deficits   [] Functional mobility             [x] ADLs           [x] Strength                  [] Cognition   [] Functional transfers           [x] IADLs          [] Safety Awareness  [] Endurance   [x] Fine Motor Coordination    [] Balance      [] Vision/perception    [x] Sensation     [x] Gross Motor Coordination [x] ROM           [x] Pain                        [x] Edema          [x] Scar Adhesion/Skin Integrity      OT PLAN OF CARE   OT POC based on physician orders, patient diagnosis and results of clinical assessment     Frequency/Duration: 2x/ week x 6-8 weeks     Specific OT Treatment to include:   [x] Instruction in HEP

## 2025-01-06 ENCOUNTER — TREATMENT (OUTPATIENT)
Dept: OCCUPATIONAL THERAPY | Age: 50
End: 2025-01-06
Payer: COMMERCIAL

## 2025-01-06 DIAGNOSIS — S64.40XA INJURY OF DIGITAL NERVE OF UNSPECIFIED FINGER, INITIAL ENCOUNTER: ICD-10-CM

## 2025-01-06 DIAGNOSIS — S61.409A UNSPECIFIED OPEN WOUND OF UNSPECIFIED HAND, INITIAL ENCOUNTER: ICD-10-CM

## 2025-01-06 DIAGNOSIS — S66.829A LACERATION OF OTHER SPECIFIED MUSCLES, FASCIA AND TENDONS AT WRIST AND HAND LEVEL, UNSPECIFIED HAND, INITIAL ENCOUNTER: Primary | ICD-10-CM

## 2025-01-06 PROCEDURE — 97110 THERAPEUTIC EXERCISES: CPT | Performed by: OCCUPATIONAL THERAPIST

## 2025-01-06 PROCEDURE — 97018 PARAFFIN BATH THERAPY: CPT | Performed by: OCCUPATIONAL THERAPIST

## 2025-01-06 PROCEDURE — 97140 MANUAL THERAPY 1/> REGIONS: CPT | Performed by: OCCUPATIONAL THERAPIST

## 2025-01-06 NOTE — PROGRESS NOTES
OCCUPATIONAL THERAPY DAILY NOTE  [facility] PHYSICIANS [place] SPECIALTY [facility] OCCUPATIONAL THERAPY   [address] [place] [address]  Dept: [phone]  Loc: [phone]   [facility] OT Fax: [phone]      Date:  2025  Initial Evaluation Date: 24     Evaluating Therapist: Anna Marie Hagan OT    Patient Name:  Marbella Gardner    :  1975  Restrictions/Precautions:  Per Flexor Tendon Repair Protocol, low fall risk  Diagnosis: R MF Digital nerve Repair, RF FDS & FDP with Digital Nerve Repairs, & SF FDP and Digital Nerve Repair             [S66.829A] - Laceration of other specified muscles, fascia, and tendons at wrist and hand level, unspecified  [S64.40XA] - Injury of digital nerve of unspecified finger, initial encounter  [S61.409A] - Unspecified open wound of unspecified hand, initial encounter            Date of Surgery/Injury: 2024 sx     Insurance/Certification information:  [place]  Plan of care signed (Y/N): Y - signed electronically  Visit# / total visits: -16 visits     Referring Practitioner:  Dr. Brian Plunkett DO  Specific Practitioner Orders: Flexor Tendon Repair Protocol. Splint Needed: Dorsal blocking. PROM AAROM, AROM, stretching, scar management, strengthening, and modalities PRN.     Assessment of current deficits   [] Functional mobility             [x] ADLs           [x] Strength                  [] Cognition   [] Functional transfers           [x] IADLs          [] Safety Awareness  [] Endurance   [x] Fine Motor Coordination    [] Balance      [] Vision/perception    [x] Sensation     [x] Gross Motor Coordination [x] ROM           [x] Pain                        [x] Edema          [x] Scar Adhesion/Skin Integrity      OT PLAN OF CARE   OT POC based on physician orders, patient diagnosis and results of clinical assessment     Frequency/Duration: 2x/ week x 6-8 weeks     Specific OT Treatment to include:   [x] Instruction in HEP

## 2025-01-08 ENCOUNTER — TREATMENT (OUTPATIENT)
Dept: OCCUPATIONAL THERAPY | Age: 50
End: 2025-01-08
Payer: COMMERCIAL

## 2025-01-08 DIAGNOSIS — S66.829A LACERATION OF OTHER SPECIFIED MUSCLES, FASCIA AND TENDONS AT WRIST AND HAND LEVEL, UNSPECIFIED HAND, INITIAL ENCOUNTER: Primary | ICD-10-CM

## 2025-01-08 DIAGNOSIS — S64.40XA INJURY OF DIGITAL NERVE OF UNSPECIFIED FINGER, INITIAL ENCOUNTER: ICD-10-CM

## 2025-01-08 DIAGNOSIS — S61.409A UNSPECIFIED OPEN WOUND OF UNSPECIFIED HAND, INITIAL ENCOUNTER: ICD-10-CM

## 2025-01-08 PROCEDURE — 97018 PARAFFIN BATH THERAPY: CPT | Performed by: OCCUPATIONAL THERAPIST

## 2025-01-08 PROCEDURE — 97140 MANUAL THERAPY 1/> REGIONS: CPT | Performed by: OCCUPATIONAL THERAPIST

## 2025-01-08 PROCEDURE — 97110 THERAPEUTIC EXERCISES: CPT | Performed by: OCCUPATIONAL THERAPIST

## 2025-01-08 NOTE — PROGRESS NOTES
OCCUPATIONAL THERAPY DAILY NOTE  [facility] PHYSICIANS [place] SPECIALTY [facility] OCCUPATIONAL THERAPY   [address] [place] [address]  Dept: [phone]  Loc: [phone]   [facility] OT Fax: [phone]      Date:  2025  Initial Evaluation Date: 24     Evaluating Therapist: Anna Marie Hagan OT    Patient Name:  Marbella Gardner    :  1975  Restrictions/Precautions:  Per Flexor Tendon Repair Protocol, low fall risk  Diagnosis: R MF Digital nerve Repair, RF FDS & FDP with Digital Nerve Repairs, & SF FDP and Digital Nerve Repair             [S66.829A] - Laceration of other specified muscles, fascia, and tendons at wrist and hand level, unspecified  [S64.40XA] - Injury of digital nerve of unspecified finger, initial encounter  [S61.409A] - Unspecified open wound of unspecified hand, initial encounter            Date of Surgery/Injury: 2024 sx     Insurance/Certification information:  [place]  Plan of care signed (Y/N): Y - signed electronically  Visit# / total visits: 15 / 12-16 visits     Referring Practitioner:  Dr. Brian Plunkett DO  Specific Practitioner Orders: Flexor Tendon Repair Protocol. Splint Needed: Dorsal blocking. PROM AAROM, AROM, stretching, scar management, strengthening, and modalities PRN.     Assessment of current deficits   [] Functional mobility             [x] ADLs           [x] Strength                  [] Cognition   [] Functional transfers           [x] IADLs          [] Safety Awareness  [] Endurance   [x] Fine Motor Coordination    [] Balance      [] Vision/perception    [x] Sensation     [x] Gross Motor Coordination [x] ROM           [x] Pain                        [x] Edema          [x] Scar Adhesion/Skin Integrity      OT PLAN OF CARE   OT POC based on physician orders, patient diagnosis and results of clinical assessment     Frequency/Duration: 2x/ week x 6-8 weeks     Specific OT Treatment to include:   [x] Instruction in HEP

## 2025-01-13 ENCOUNTER — TREATMENT (OUTPATIENT)
Dept: OCCUPATIONAL THERAPY | Age: 50
End: 2025-01-13
Payer: COMMERCIAL

## 2025-01-13 DIAGNOSIS — S61.409A UNSPECIFIED OPEN WOUND OF UNSPECIFIED HAND, INITIAL ENCOUNTER: ICD-10-CM

## 2025-01-13 DIAGNOSIS — S66.829A LACERATION OF OTHER SPECIFIED MUSCLES, FASCIA AND TENDONS AT WRIST AND HAND LEVEL, UNSPECIFIED HAND, INITIAL ENCOUNTER: Primary | ICD-10-CM

## 2025-01-13 DIAGNOSIS — S64.40XA INJURY OF DIGITAL NERVE OF UNSPECIFIED FINGER, INITIAL ENCOUNTER: ICD-10-CM

## 2025-01-13 PROCEDURE — 97140 MANUAL THERAPY 1/> REGIONS: CPT | Performed by: OCCUPATIONAL THERAPIST

## 2025-01-13 PROCEDURE — 97530 THERAPEUTIC ACTIVITIES: CPT | Performed by: OCCUPATIONAL THERAPIST

## 2025-01-13 PROCEDURE — 97018 PARAFFIN BATH THERAPY: CPT | Performed by: OCCUPATIONAL THERAPIST

## 2025-01-13 NOTE — PROGRESS NOTES
OCCUPATIONAL THERAPY DAILY NOTE/ UPDATE  [facility] PHYSICIANS [place] SPECIALTY CARE [facility] OCCUPATIONAL THERAPY   [address]  [place] [address]  Dept: [phone]  Loc: [phone]   [facility] OT Fax: [phone]      Date:  2025  Initial Evaluation Date: 24     Evaluating Therapist: Anna Marie Hagan OT    Patient Name:  Marbella Gardner    :  1975  Restrictions/Precautions:  Per Flexor Tendon Repair Protocol, low fall risk  Diagnosis: R MF Digital nerve Repair, RF FDS & FDP with Digital Nerve Repairs, & SF FDP and Digital Nerve Repair             [S66.829A] - Laceration of other specified muscles, fascia, and tendons at wrist and hand level, unspecified  [S64.40XA] - Injury of digital nerve of unspecified finger, initial encounter  [S61.409A] - Unspecified open wound of unspecified hand, initial encounter            Date of Surgery/Injury: 2024 sx     Insurance/Certification information:  [place]  Plan of care signed (Y/N): Y - signed electronically  Visit# / total visits: -16 visits     Referring Practitioner:  Dr. Brian Plunkett DO  Specific Practitioner Orders: Flexor Tendon Repair Protocol. Splint Needed: Dorsal blocking. PROM AAROM, AROM, stretching, scar management, strengthening, and modalities PRN.     Assessment of current deficits   [] Functional mobility             [x] ADLs           [x] Strength                  [] Cognition   [] Functional transfers           [x] IADLs          [] Safety Awareness  [] Endurance   [x] Fine Motor Coordination    [] Balance      [] Vision/perception    [x] Sensation     [x] Gross Motor Coordination [x] ROM           [x] Pain                        [x] Edema          [x] Scar Adhesion/Skin Integrity      OT PLAN OF CARE   OT POC based on physician orders, patient diagnosis and results of clinical assessment     Frequency/Duration: 2x/ week x 6-8 weeks     Specific OT Treatment to include:   [x] Instruction in HEP        
1-2 cups/cans per day

## 2025-01-15 ENCOUNTER — TREATMENT (OUTPATIENT)
Dept: OCCUPATIONAL THERAPY | Age: 50
End: 2025-01-15
Payer: COMMERCIAL

## 2025-01-15 DIAGNOSIS — S61.409A UNSPECIFIED OPEN WOUND OF UNSPECIFIED HAND, INITIAL ENCOUNTER: ICD-10-CM

## 2025-01-15 DIAGNOSIS — S64.40XA INJURY OF DIGITAL NERVE OF UNSPECIFIED FINGER, INITIAL ENCOUNTER: ICD-10-CM

## 2025-01-15 DIAGNOSIS — S66.829A LACERATION OF OTHER SPECIFIED MUSCLES, FASCIA AND TENDONS AT WRIST AND HAND LEVEL, UNSPECIFIED HAND, INITIAL ENCOUNTER: Primary | ICD-10-CM

## 2025-01-15 PROCEDURE — 97140 MANUAL THERAPY 1/> REGIONS: CPT | Performed by: OCCUPATIONAL THERAPIST

## 2025-01-15 PROCEDURE — 97760 ORTHOTIC MGMT&TRAING 1ST ENC: CPT | Performed by: OCCUPATIONAL THERAPIST

## 2025-01-15 PROCEDURE — 97530 THERAPEUTIC ACTIVITIES: CPT | Performed by: OCCUPATIONAL THERAPIST

## 2025-01-15 PROCEDURE — 97018 PARAFFIN BATH THERAPY: CPT | Performed by: OCCUPATIONAL THERAPIST

## 2025-01-15 NOTE — PROGRESS NOTES
OCCUPATIONAL THERAPY DAILY NOTE/ UPDATE  [facility] PHYSICIANS [place] SPECIALTY CARE [facility] OCCUPATIONAL THERAPY   [address]  [place] [address]  Dept: [phone]  Loc: [phone]   [facility] OT Fax: [phone]      Date:  1/15/2025  Initial Evaluation Date: 24     Evaluating Therapist: Anna Marie Hagan OT    Patient Name:  Marbella Gardner    :  1975  Restrictions/Precautions:  Per Flexor Tendon Repair Protocol, low fall risk  Diagnosis: R MF Digital nerve Repair, RF FDS & FDP with Digital Nerve Repairs, & SF FDP and Digital Nerve Repair             [S66.829A] - Laceration of other specified muscles, fascia, and tendons at wrist and hand level, unspecified  [S64.40XA] - Injury of digital nerve of unspecified finger, initial encounter  [S61.409A] - Unspecified open wound of unspecified hand, initial encounter            Date of Surgery/Injury: 2024 sx     Insurance/Certification information:  [place]  Plan of care signed (Y/N): Y - signed electronically  Visit# / total visits: -16 visits     Referring Practitioner:  Dr. Brian Plunkett DO  Specific Practitioner Orders: Flexor Tendon Repair Protocol. Splint Needed: Dorsal blocking. PROM AAROM, AROM, stretching, scar management, strengthening, and modalities PRN.     Assessment of current deficits   [] Functional mobility             [x] ADLs           [x] Strength                  [] Cognition   [] Functional transfers           [x] IADLs          [] Safety Awareness  [] Endurance   [x] Fine Motor Coordination    [] Balance      [] Vision/perception    [x] Sensation     [x] Gross Motor Coordination [x] ROM           [x] Pain                        [x] Edema          [x] Scar Adhesion/Skin Integrity      OT PLAN OF CARE   OT POC based on physician orders, patient diagnosis and results of clinical assessment     Frequency/Duration: 2x/ week x 6-8 weeks     Specific OT Treatment to include:   [x] Instruction in HEP

## 2025-02-03 ENCOUNTER — TREATMENT (OUTPATIENT)
Dept: OCCUPATIONAL THERAPY | Age: 50
End: 2025-02-03
Payer: COMMERCIAL

## 2025-02-03 DIAGNOSIS — S66.829A LACERATION OF OTHER SPECIFIED MUSCLES, FASCIA AND TENDONS AT WRIST AND HAND LEVEL, UNSPECIFIED HAND, INITIAL ENCOUNTER: Primary | ICD-10-CM

## 2025-02-03 DIAGNOSIS — S61.409A UNSPECIFIED OPEN WOUND OF UNSPECIFIED HAND, INITIAL ENCOUNTER: ICD-10-CM

## 2025-02-03 DIAGNOSIS — S64.40XA INJURY OF DIGITAL NERVE OF UNSPECIFIED FINGER, INITIAL ENCOUNTER: ICD-10-CM

## 2025-02-03 PROCEDURE — 97018 PARAFFIN BATH THERAPY: CPT | Performed by: OCCUPATIONAL THERAPIST

## 2025-02-03 PROCEDURE — 97530 THERAPEUTIC ACTIVITIES: CPT | Performed by: OCCUPATIONAL THERAPIST

## 2025-02-03 PROCEDURE — 97140 MANUAL THERAPY 1/> REGIONS: CPT | Performed by: OCCUPATIONAL THERAPIST

## 2025-02-03 NOTE — PROGRESS NOTES
OCCUPATIONAL THERAPY DAILY NOTE/ UPDATE  [facility] PHYSICIANS [place] SPECIALTY CARE [facility] OCCUPATIONAL THERAPY   [address]  [place] [address]  Dept: [phone]  Loc: [phone]   [facility] OT Fax: [phone]      Date:  2/3/2025  Initial Evaluation Date: 24     Evaluating Therapist: Anna Marie Hagan OT    Patient Name:  Marbella Gardner    :  1975  Restrictions/Precautions:  Per Flexor Tendon Repair Protocol, low fall risk  Diagnosis: R MF Digital nerve Repair, RF FDS & FDP with Digital Nerve Repairs, & SF FDP and Digital Nerve Repair             [S66.829A] - Laceration of other specified muscles, fascia, and tendons at wrist and hand level, unspecified  [S64.40XA] - Injury of digital nerve of unspecified finger, initial encounter  [S61.409A] - Unspecified open wound of unspecified hand, initial encounter            Date of Surgery/Injury: 2024 sx     Insurance/Certification information:  [place]  Plan of care signed (Y/N): Y - signed electronically  Visit# / total visits: 1 / 10 additional visits- 16 visits completed     Referring Practitioner:  Dr. Brian Plunkett,   Specific Practitioner Orders: Flexor Tendon Repair Protocol. Splint Needed: Dorsal blocking. PROM AAROM, AROM, stretching, scar management, strengthening, and modalities PRN.     Assessment of current deficits   [] Functional mobility             [x] ADLs           [x] Strength                  [] Cognition   [] Functional transfers           [x] IADLs          [] Safety Awareness  [] Endurance   [x] Fine Motor Coordination    [] Balance      [] Vision/perception    [x] Sensation     [x] Gross Motor Coordination [x] ROM           [x] Pain                        [x] Edema          [x] Scar Adhesion/Skin Integrity      OT PLAN OF CARE   OT POC based on physician orders, patient diagnosis and results of clinical assessment     Frequency/Duration: 2x/ week x 6-8 weeks     Specific OT Treatment to include:

## 2025-02-05 ENCOUNTER — TREATMENT (OUTPATIENT)
Dept: OCCUPATIONAL THERAPY | Age: 50
End: 2025-02-05

## 2025-02-05 DIAGNOSIS — S66.829A LACERATION OF OTHER SPECIFIED MUSCLES, FASCIA AND TENDONS AT WRIST AND HAND LEVEL, UNSPECIFIED HAND, INITIAL ENCOUNTER: Primary | ICD-10-CM

## 2025-02-05 DIAGNOSIS — S61.409A UNSPECIFIED OPEN WOUND OF UNSPECIFIED HAND, INITIAL ENCOUNTER: ICD-10-CM

## 2025-02-05 DIAGNOSIS — S64.40XA INJURY OF DIGITAL NERVE OF UNSPECIFIED FINGER, INITIAL ENCOUNTER: ICD-10-CM

## 2025-02-05 NOTE — PROGRESS NOTES
hold flexion each digit  - blocking ex at PIPs and at DIPs  - digital ABD/ ADD  -Composite fist place and holds  - grasp/ release beans  - exercise balls CLW/ CCLW  - in hand manipulation with bunchems   Wrist  - Wrist Jux-a-ciser   PROM/Stretching:      X    x  x               - passive stretches to DIPs each digit, PIPs and composite flexion and extension  - intrinsic hand stretches  - Manual  PIP extension stretch MF/RF -SF   - gentle stretches for digital abduction  - thumb PROM all planes--> attn ABD  - Wrist PROM all planes  -Putty RF/SF drag thru's for extension stretching x 12 ea, + to HEP  - prayer stretch 10 sec hold- 10x--> continue at home        Scar Mass/Edema Control:     Soft tissue mob/ desensitization X  - palmar massage   Edema control  - digital compression  - Retrograde massage digits/ hand   Scar mgmt X    x    x - manual scar massage as tolerated including graston and/ or dycem as tolerated  - silicone sheet replacement provided/ may benefit from elastomer insert as well  - elastomer insert fabricated for night use under splint/ continue til next session and will check on effectiveness   Strengthening:     Hand strengthening              -Light Riddle Putty: gripping, manipulating, rolling, alternating pinching, and digital extension x 8 each. + to HEP  - Light tan putty gripping with full amount--gradually decreasing to small amount  - Soft purple putty with putty tools (large lid, hook and key- 5x each)        Other:     Wound checks x - doing well/ no active bleeding or seepage   Resting hand based splint X    x - night resting splint fabricated to ^ the digits in extension while at rest to minimize flexor tightness.   - 2/5 strapping and velcro replaced as needed   LMB splint x - Large added for MF and RF to encourage extension     Assessment/Comments:  Tx continue continued with improved joint flexibility. Active flexor tendon excursion remains limited in the RF. Active extension limitation

## 2025-02-06 DIAGNOSIS — F32.A ANXIETY AND DEPRESSION: ICD-10-CM

## 2025-02-06 DIAGNOSIS — F41.9 ANXIETY AND DEPRESSION: ICD-10-CM

## 2025-02-06 RX ORDER — BUPROPION HYDROCHLORIDE 200 MG/1
200 TABLET, EXTENDED RELEASE ORAL 2 TIMES DAILY
Qty: 180 TABLET | Refills: 0 | Status: SHIPPED | OUTPATIENT
Start: 2025-02-06

## 2025-02-06 NOTE — TELEPHONE ENCOUNTER
Name of Medication(s) Requested:  Requested Prescriptions     Pending Prescriptions Disp Refills    buPROPion (WELLBUTRIN SR) 200 MG extended release tablet [Pharmacy Med Name: buPROPion HCl ER (SR) Oral Tablet Extended Release 12 Hour 200 MG] 180 tablet 0     Sig: TAKE ONE TABLET BY MOUTH TWO TIMES A DAY       Medication is on current medication list Yes    Dosage and directions were verified? Yes    Quantity verified: 90 day supply     Pharmacy Verified?  Yes    Last Appointment:  11/20/2024    Future appts:  Future Appointments   Date Time Provider Department Center   2/10/2025  8:00 AM Anna Marie Hagan OT [place] OT [facility]   2/12/2025  9:00 AM Anna Marie Hagan OT [place] [facility]        (If no appt send self scheduling link. .REFILLAPPT)  Scheduling request sent?     [] Yes  [] No    Does patient need updated?  [] Yes  [] No

## 2025-02-10 ENCOUNTER — TREATMENT (OUTPATIENT)
Dept: OCCUPATIONAL THERAPY | Age: 50
End: 2025-02-10

## 2025-02-10 DIAGNOSIS — S61.409A UNSPECIFIED OPEN WOUND OF UNSPECIFIED HAND, INITIAL ENCOUNTER: ICD-10-CM

## 2025-02-10 DIAGNOSIS — S66.829A LACERATION OF OTHER SPECIFIED MUSCLES, FASCIA AND TENDONS AT WRIST AND HAND LEVEL, UNSPECIFIED HAND, INITIAL ENCOUNTER: Primary | ICD-10-CM

## 2025-02-10 DIAGNOSIS — S64.40XA INJURY OF DIGITAL NERVE OF UNSPECIFIED FINGER, INITIAL ENCOUNTER: ICD-10-CM

## 2025-02-10 NOTE — PROGRESS NOTES
OCCUPATIONAL THERAPY DAILY NOTE/ UPDATE  [facility] PHYSICIANS [place] SPECIALTY CARE [facility] OCCUPATIONAL THERAPY   [address]  [place] [address]  Dept: [phone]  Loc: [phone]   [facility] OT Fax: [phone]      Date:  2/10/2025  Initial Evaluation Date: 24     Evaluating Therapist: Anna Marie Hagan OT    Patient Name:  Marbella Gardner    :  1975  Restrictions/Precautions:  Per Flexor Tendon Repair Protocol, low fall risk  Diagnosis: R MF Digital nerve Repair, RF FDS & FDP with Digital Nerve Repairs, & SF FDP and Digital Nerve Repair             [S66.829A] - Laceration of other specified muscles, fascia, and tendons at wrist and hand level, unspecified  [S64.40XA] - Injury of digital nerve of unspecified finger, initial encounter  [S61.409A] - Unspecified open wound of unspecified hand, initial encounter            Date of Surgery/Injury: 2024 sx     Insurance/Certification information:  [place]  Plan of care signed (Y/N): Y - signed electronically  Visit# / total visits: 3 / 10 additional visits- 16 visits completed     Referring Practitioner:  Dr. Brian Plunkett,   Specific Practitioner Orders: Flexor Tendon Repair Protocol. Splint Needed: Dorsal blocking. PROM AAROM, AROM, stretching, scar management, strengthening, and modalities PRN.     Assessment of current deficits   [] Functional mobility             [x] ADLs           [x] Strength                  [] Cognition   [] Functional transfers           [x] IADLs          [] Safety Awareness  [] Endurance   [x] Fine Motor Coordination    [] Balance      [] Vision/perception    [x] Sensation     [x] Gross Motor Coordination [x] ROM           [x] Pain                        [x] Edema          [x] Scar Adhesion/Skin Integrity      OT PLAN OF CARE   OT POC based on physician orders, patient diagnosis and results of clinical assessment     Frequency/Duration: 2x/ week x 6-8 weeks     Specific OT Treatment to include:

## 2025-02-12 ENCOUNTER — TREATMENT (OUTPATIENT)
Dept: OCCUPATIONAL THERAPY | Age: 50
End: 2025-02-12

## 2025-02-12 DIAGNOSIS — S64.40XA INJURY OF DIGITAL NERVE OF UNSPECIFIED FINGER, INITIAL ENCOUNTER: ICD-10-CM

## 2025-02-12 DIAGNOSIS — S66.829A LACERATION OF OTHER SPECIFIED MUSCLES, FASCIA AND TENDONS AT WRIST AND HAND LEVEL, UNSPECIFIED HAND, INITIAL ENCOUNTER: Primary | ICD-10-CM

## 2025-02-12 DIAGNOSIS — S61.409A UNSPECIFIED OPEN WOUND OF UNSPECIFIED HAND, INITIAL ENCOUNTER: ICD-10-CM

## 2025-02-12 NOTE — PROGRESS NOTES
OCCUPATIONAL THERAPY DAILY NOTE/ UPDATE  [facility] PHYSICIANS [place] SPECIALTY CARE [facility] OCCUPATIONAL THERAPY   [address]  [place] [address]  Dept: [phone]  Loc: [phone]   [facility] OT Fax: [phone]      Date:  2025  Initial Evaluation Date: 24     Evaluating Therapist: Anna Marie Hagan OT    Patient Name:  Marbella Gardner    :  1975  Restrictions/Precautions:  Per Flexor Tendon Repair Protocol, low fall risk  Diagnosis: R MF Digital nerve Repair, RF FDS & FDP with Digital Nerve Repairs, & SF FDP and Digital Nerve Repair             [S66.829A] - Laceration of other specified muscles, fascia, and tendons at wrist and hand level, unspecified  [S64.40XA] - Injury of digital nerve of unspecified finger, initial encounter  [S61.409A] - Unspecified open wound of unspecified hand, initial encounter            Date of Surgery/Injury: 2024 sx     Insurance/Certification information:  [place]  Plan of care signed (Y/N): Y - signed electronically  Visit# / total visits: 4 / 10 additional visits- 16 visits completed     Referring Practitioner:  Dr. Brian Plunkett,   Specific Practitioner Orders: Flexor Tendon Repair Protocol. Splint Needed: Dorsal blocking. PROM AAROM, AROM, stretching, scar management, strengthening, and modalities PRN.     Assessment of current deficits   [] Functional mobility             [x] ADLs           [x] Strength                  [] Cognition   [] Functional transfers           [x] IADLs          [] Safety Awareness  [] Endurance   [x] Fine Motor Coordination    [] Balance      [] Vision/perception    [x] Sensation     [x] Gross Motor Coordination [x] ROM           [x] Pain                        [x] Edema          [x] Scar Adhesion/Skin Integrity      OT PLAN OF CARE   OT POC based on physician orders, patient diagnosis and results of clinical assessment     Frequency/Duration: 2x/ week x 6-8 weeks     Specific OT Treatment to include:

## 2025-02-17 ENCOUNTER — TREATMENT (OUTPATIENT)
Dept: OCCUPATIONAL THERAPY | Age: 50
End: 2025-02-17
Payer: COMMERCIAL

## 2025-02-17 DIAGNOSIS — S64.40XA INJURY OF DIGITAL NERVE OF UNSPECIFIED FINGER, INITIAL ENCOUNTER: ICD-10-CM

## 2025-02-17 DIAGNOSIS — S61.409A UNSPECIFIED OPEN WOUND OF UNSPECIFIED HAND, INITIAL ENCOUNTER: ICD-10-CM

## 2025-02-17 DIAGNOSIS — S66.829A LACERATION OF OTHER SPECIFIED MUSCLES, FASCIA AND TENDONS AT WRIST AND HAND LEVEL, UNSPECIFIED HAND, INITIAL ENCOUNTER: Primary | ICD-10-CM

## 2025-02-17 PROCEDURE — 97140 MANUAL THERAPY 1/> REGIONS: CPT | Performed by: OCCUPATIONAL THERAPIST

## 2025-02-17 PROCEDURE — 97018 PARAFFIN BATH THERAPY: CPT | Performed by: OCCUPATIONAL THERAPIST

## 2025-02-17 PROCEDURE — 97530 THERAPEUTIC ACTIVITIES: CPT | Performed by: OCCUPATIONAL THERAPIST

## 2025-02-17 PROCEDURE — 97110 THERAPEUTIC EXERCISES: CPT | Performed by: OCCUPATIONAL THERAPIST

## 2025-02-17 NOTE — PROGRESS NOTES
OCCUPATIONAL THERAPY DAILY NOTE/ UPDATE  [facility] PHYSICIANS [place] SPECIALTY CARE [facility] OCCUPATIONAL THERAPY   [address]  [place] [address]  Dept: [phone]  Loc: [phone]   [facility] OT Fax: [phone]      Date:  2025  Initial Evaluation Date: 24     Evaluating Therapist: Anna Marie Hagan OT    Patient Name:  Marbella Gardner    :  1975  Restrictions/Precautions:  Per Flexor Tendon Repair Protocol, low fall risk  Diagnosis: R MF Digital nerve Repair, RF FDS & FDP with Digital Nerve Repairs, & SF FDP and Digital Nerve Repair             [S66.829A] - Laceration of other specified muscles, fascia, and tendons at wrist and hand level, unspecified  [S64.40XA] - Injury of digital nerve of unspecified finger, initial encounter  [S61.409A] - Unspecified open wound of unspecified hand, initial encounter            Date of Surgery/Injury: 2024 sx     Insurance/Certification information:  [place]  Plan of care signed (Y/N): Y - signed electronically  Visit# / total visits: 5 / 10 additional visits- 16 visits completed     Referring Practitioner:  Dr. Brian Plunkett,   Specific Practitioner Orders: Flexor Tendon Repair Protocol. Splint Needed: Dorsal blocking. PROM AAROM, AROM, stretching, scar management, strengthening, and modalities PRN.     Assessment of current deficits   [] Functional mobility             [x] ADLs           [x] Strength                  [] Cognition   [] Functional transfers           [x] IADLs          [] Safety Awareness  [] Endurance   [x] Fine Motor Coordination    [] Balance      [] Vision/perception    [x] Sensation     [x] Gross Motor Coordination [x] ROM           [x] Pain                        [x] Edema          [x] Scar Adhesion/Skin Integrity      OT PLAN OF CARE   OT POC based on physician orders, patient diagnosis and results of clinical assessment     Frequency/Duration: 2x/ week x 6-8 weeks     Specific OT Treatment to include:

## 2025-02-19 ENCOUNTER — TELEPHONE (OUTPATIENT)
Dept: FAMILY MEDICINE CLINIC | Age: 50
End: 2025-02-19

## 2025-02-19 DIAGNOSIS — G43.109 MIGRAINE WITH AURA AND WITHOUT STATUS MIGRAINOSUS, NOT INTRACTABLE: ICD-10-CM

## 2025-02-19 RX ORDER — ONDANSETRON 4 MG/1
4 TABLET, FILM COATED ORAL DAILY PRN
Qty: 30 TABLET | Refills: 0 | Status: SHIPPED | OUTPATIENT
Start: 2025-02-19

## 2025-02-19 RX ORDER — BUTALBITAL, ACETAMINOPHEN AND CAFFEINE 50; 325; 40 MG/1; MG/1; MG/1
TABLET ORAL
Qty: 30 TABLET | Refills: 0 | Status: SHIPPED | OUTPATIENT
Start: 2025-02-19

## 2025-02-19 RX ORDER — CYCLOBENZAPRINE HCL 5 MG
5 TABLET ORAL 2 TIMES DAILY PRN
Qty: 60 TABLET | Refills: 2 | Status: SHIPPED | OUTPATIENT
Start: 2025-02-19

## 2025-02-19 NOTE — TELEPHONE ENCOUNTER
Name of Medication(s) Requested:  Requested Prescriptions     Pending Prescriptions Disp Refills    cyclobenzaprine (FLEXERIL) 5 MG tablet 60 tablet 2     Sig: Take 1 tablet by mouth 2 times daily as needed for Muscle spasms    ondansetron (ZOFRAN) 4 MG tablet 30 tablet 0     Sig: Take 1 tablet by mouth daily as needed for Nausea or Vomiting       Medication is on current medication list Yes    Dosage and directions were verified? Yes    Quantity verified: 30 day supply     Pharmacy Verified?  Yes    Last Appointment:  11/20/2024    Future appts:  Future Appointments   Date Time Provider Department Center   2/24/2025  9:00 AM Anna Marie Hagan OT [place] [facility]   2/26/2025 10:00 AM Anna Marie Hagan OT [place] [facility]        (If no appt send self scheduling link. .REFILLAPPT)  Scheduling request sent?     [] Yes  [x] No    Does patient need updated?  [] Yes  [x] No

## 2025-02-19 NOTE — TELEPHONE ENCOUNTER
Name of Medication(s) Requested:  Requested Prescriptions     Pending Prescriptions Disp Refills    butalbital-acetaminophen-caffeine (FIORICET, ESGIC) -40 MG per tablet 30 tablet 0     Sig: TAKE ONE TABLET BY MOUTH EVERY 4 HOURS AS NEEDED for headaches or migraine  Strength: -40 mg       Medication is on current medication list Yes    Dosage and directions were verified? Yes    Quantity verified: 30 day supply     Pharmacy Verified?  Yes    Last Appointment:  Visit date not found    Future appts:  Future Appointments   Date Time Provider Department Center   2/24/2025  9:00 AM Anna Marie Hagan OT [place] [facility]   2/26/2025 10:00 AM Anna Marie Hagan OT [place] [facility]        (If no appt send self scheduling link. .REFILLAPPT)  Scheduling request sent?     [] Yes  [x] No    Does patient need updated?  [] Yes  [x] No

## 2025-02-24 ENCOUNTER — TREATMENT (OUTPATIENT)
Dept: OCCUPATIONAL THERAPY | Age: 50
End: 2025-02-24

## 2025-02-24 DIAGNOSIS — S61.409A UNSPECIFIED OPEN WOUND OF UNSPECIFIED HAND, INITIAL ENCOUNTER: ICD-10-CM

## 2025-02-24 DIAGNOSIS — S66.829A LACERATION OF OTHER SPECIFIED MUSCLES, FASCIA AND TENDONS AT WRIST AND HAND LEVEL, UNSPECIFIED HAND, INITIAL ENCOUNTER: Primary | ICD-10-CM

## 2025-02-24 DIAGNOSIS — S64.40XA INJURY OF DIGITAL NERVE OF UNSPECIFIED FINGER, INITIAL ENCOUNTER: ICD-10-CM

## 2025-02-24 NOTE — PROGRESS NOTES
OCCUPATIONAL THERAPY DAILY NOTE/ UPDATE  [facility] PHYSICIANS [place] SPECIALTY CARE [facility] OCCUPATIONAL THERAPY   [address]  [place] [address]  Dept: [phone]  Loc: [phone]   [facility] OT Fax: [phone]      Date:  2025  Initial Evaluation Date: 24     Evaluating Therapist: Anna Marie Hagan OT    Patient Name:  Marbella Gardner    :  1975  Restrictions/Precautions:  Per Flexor Tendon Repair Protocol, low fall risk  Diagnosis: R MF Digital nerve Repair, RF FDS & FDP with Digital Nerve Repairs, & SF FDP and Digital Nerve Repair             [S66.829A] - Laceration of other specified muscles, fascia, and tendons at wrist and hand level, unspecified  [S64.40XA] - Injury of digital nerve of unspecified finger, initial encounter  [S61.409A] - Unspecified open wound of unspecified hand, initial encounter            Date of Surgery/Injury: 2024 sx     Insurance/Certification information:  [place]  Plan of care signed (Y/N): Y - signed electronically  Visit# / total visits: 6 / 10 additional visits- 16 visits completed     Referring Practitioner:  Dr. Brian Plunkett,   Specific Practitioner Orders: Flexor Tendon Repair Protocol. Splint Needed: Dorsal blocking. PROM AAROM, AROM, stretching, scar management, strengthening, and modalities PRN.     Assessment of current deficits   [] Functional mobility             [x] ADLs           [x] Strength                  [] Cognition   [] Functional transfers           [x] IADLs          [] Safety Awareness  [] Endurance   [x] Fine Motor Coordination    [] Balance      [] Vision/perception    [x] Sensation     [x] Gross Motor Coordination [x] ROM           [x] Pain                        [x] Edema          [x] Scar Adhesion/Skin Integrity      OT PLAN OF CARE   OT POC based on physician orders, patient diagnosis and results of clinical assessment     Frequency/Duration: 2x/ week x 6-8 weeks     Specific OT Treatment to include:

## 2025-02-26 ENCOUNTER — TREATMENT (OUTPATIENT)
Dept: OCCUPATIONAL THERAPY | Age: 50
End: 2025-02-26
Payer: COMMERCIAL

## 2025-02-26 DIAGNOSIS — S66.829A LACERATION OF OTHER SPECIFIED MUSCLES, FASCIA AND TENDONS AT WRIST AND HAND LEVEL, UNSPECIFIED HAND, INITIAL ENCOUNTER: Primary | ICD-10-CM

## 2025-02-26 DIAGNOSIS — S61.409A UNSPECIFIED OPEN WOUND OF UNSPECIFIED HAND, INITIAL ENCOUNTER: ICD-10-CM

## 2025-02-26 DIAGNOSIS — S64.40XA INJURY OF DIGITAL NERVE OF UNSPECIFIED FINGER, INITIAL ENCOUNTER: ICD-10-CM

## 2025-02-26 PROCEDURE — 97110 THERAPEUTIC EXERCISES: CPT | Performed by: OCCUPATIONAL THERAPIST

## 2025-02-26 PROCEDURE — 97018 PARAFFIN BATH THERAPY: CPT | Performed by: OCCUPATIONAL THERAPIST

## 2025-02-26 PROCEDURE — 97140 MANUAL THERAPY 1/> REGIONS: CPT | Performed by: OCCUPATIONAL THERAPIST

## 2025-02-26 NOTE — PROGRESS NOTES
OCCUPATIONAL THERAPY DAILY NOTE/ UPDATE  [facility] PHYSICIANS [place] SPECIALTY CARE [facility] OCCUPATIONAL THERAPY   [address]  [place] [address]  Dept: [phone]  Loc: [phone]   [facility] OT Fax: [phone]      Date:  2025  Initial Evaluation Date: 24     Evaluating Therapist: Anna Marie Hagan OT    Patient Name:  Marbella Gardner    :  1975  Restrictions/Precautions:  Per Flexor Tendon Repair Protocol, low fall risk  Diagnosis: R MF Digital nerve Repair, RF FDS & FDP with Digital Nerve Repairs, & SF FDP and Digital Nerve Repair             [S66.829A] - Laceration of other specified muscles, fascia, and tendons at wrist and hand level, unspecified  [S64.40XA] - Injury of digital nerve of unspecified finger, initial encounter  [S61.409A] - Unspecified open wound of unspecified hand, initial encounter            Date of Surgery/Injury: 2024 sx     Insurance/Certification information:  [place]  Plan of care signed (Y/N): Y - signed electronically  Visit# / total visits: 7 / 10 additional visits- 16 visits completed     Referring Practitioner:  Dr. Brian Plunkett,   Specific Practitioner Orders: Flexor Tendon Repair Protocol. Splint Needed: Dorsal blocking. PROM AAROM, AROM, stretching, scar management, strengthening, and modalities PRN.     Assessment of current deficits   [] Functional mobility             [x] ADLs           [x] Strength                  [] Cognition   [] Functional transfers           [x] IADLs          [] Safety Awareness  [] Endurance   [x] Fine Motor Coordination    [] Balance      [] Vision/perception    [x] Sensation     [x] Gross Motor Coordination [x] ROM           [x] Pain                        [x] Edema          [x] Scar Adhesion/Skin Integrity      OT PLAN OF CARE   OT POC based on physician orders, patient diagnosis and results of clinical assessment     Frequency/Duration: 2x/ week x 6-8 weeks     Specific OT Treatment to include:

## 2025-03-03 ENCOUNTER — TREATMENT (OUTPATIENT)
Dept: OCCUPATIONAL THERAPY | Age: 50
End: 2025-03-03
Payer: COMMERCIAL

## 2025-03-03 DIAGNOSIS — S64.40XA INJURY OF DIGITAL NERVE OF UNSPECIFIED FINGER, INITIAL ENCOUNTER: ICD-10-CM

## 2025-03-03 DIAGNOSIS — S66.829A LACERATION OF OTHER SPECIFIED MUSCLES, FASCIA AND TENDONS AT WRIST AND HAND LEVEL, UNSPECIFIED HAND, INITIAL ENCOUNTER: Primary | ICD-10-CM

## 2025-03-03 DIAGNOSIS — S61.409A UNSPECIFIED OPEN WOUND OF UNSPECIFIED HAND, INITIAL ENCOUNTER: ICD-10-CM

## 2025-03-03 PROCEDURE — 97140 MANUAL THERAPY 1/> REGIONS: CPT | Performed by: OCCUPATIONAL THERAPIST

## 2025-03-03 PROCEDURE — 97110 THERAPEUTIC EXERCISES: CPT | Performed by: OCCUPATIONAL THERAPIST

## 2025-03-03 PROCEDURE — 97018 PARAFFIN BATH THERAPY: CPT | Performed by: OCCUPATIONAL THERAPIST

## 2025-03-03 NOTE — PROGRESS NOTES
extension closer to neutral   LMB splint x - Large added for MF and RF to encourage extension     Assessment/Comments:  The pt reports she didn't wear her brace last tightness. Tightness in the RF/ SF is noted. Scar mgmt, manual stretches and strengthening continued today as tolerated. Plans are to continue 1 more session with transition to HEP.     -Rehab Potential: Good   -Patient Response to Treatment: Session tolerated well.     Patient. Education:  [x] Plans/Goals, Risks/Benefits discussed  [x] Home exercise program  Method of Education: [x] Verbal  [x] Demo  [] Written  Comprehension of Education:  [x] Verbalizes understanding.  [x] Demonstrates understanding.  [] Needs Review.  [] Demonstrates/verbalizes understanding of HEP/Ed previously given.      Time In: 0900    Time Out: 0955    CODE  Minutes  Units   50986 Fluidotherapy     53418 Paraffin 10 1   65909 Ultrasound     15261 Electrical Stim - Attended     90944 Iontophoresis     49205 Therapeutic Ex 25 2   41596 Therapeutic Activity     55572 Neuromuscular Re-Ed     52033 Manual Therapy 20 1   20788 ADL/COMP Tech Train     70423 Orthotic Management/Training      Other: MH                 Total  55 4       UPDATED Plan: OT 2x/week for up to 10 sessions    []  Continue Plan of care with focus on R hand splinting and HEP review.  Treatment covered based on POC and graduated to patient's progress. Pt education continues at each visit to obtain maximum benefits from skilled OT intervention.  [x]  Alter Plan of care: Continue OT POC with the addition of Goal #10 for 10 additional visits. Tx to focus on scar mgmt, stretches, ROM and functional use of the affected hand.   []  Discharge:      Anna Marie Hagan OT/LANE  [id number]

## 2025-03-05 ENCOUNTER — TREATMENT (OUTPATIENT)
Dept: OCCUPATIONAL THERAPY | Age: 50
End: 2025-03-05

## 2025-03-05 DIAGNOSIS — S66.829A LACERATION OF OTHER SPECIFIED MUSCLES, FASCIA AND TENDONS AT WRIST AND HAND LEVEL, UNSPECIFIED HAND, INITIAL ENCOUNTER: Primary | ICD-10-CM

## 2025-03-05 DIAGNOSIS — S61.409A UNSPECIFIED OPEN WOUND OF UNSPECIFIED HAND, INITIAL ENCOUNTER: ICD-10-CM

## 2025-03-05 DIAGNOSIS — S64.40XA INJURY OF DIGITAL NERVE OF UNSPECIFIED FINGER, INITIAL ENCOUNTER: ICD-10-CM

## 2025-03-05 NOTE — PROGRESS NOTES
flexor tendon pull thru in the RF. Pt may require an additional procedure. Will discharge with HEP. Pt is in agreement.     R hand AROM flexion (1-13-25)  MCP  PIP  DIP  DPC/ cm    Middle  WNL  15*-90*  0-35* 1 to WFL    Ring  WNL  47*- 55*  25*-30* 2.5 to 2 cm    Small  WNL  46*- 82*  30*- 40* 2.5 to WFL      Dynamometer (setting 2) 1/3 1/13  3/5    Left 73#   --  --   Right 25#   33# 40#        -Rehab Potential: Good   -Patient Response to Treatment: Session tolerated well.     Patient. Education:  [x] Plans/Goals, Risks/Benefits discussed  [x] Home exercise program  Method of Education: [x] Verbal  [x] Demo  [] Written  Comprehension of Education:  [x] Verbalizes understanding.  [x] Demonstrates understanding.  [] Needs Review.  [] Demonstrates/verbalizes understanding of HEP/Ed previously given.      Time In: 0900    Time Out: 0955    CODE  Minutes  Units   51561 Fluidotherapy     11024 Paraffin 10 1   88226 Ultrasound 5 0   48893 Electrical Stim - Attended     22895 Iontophoresis     77264 Therapeutic Ex     27715 Therapeutic Activity 15 1   71446 Neuromuscular Re-Ed     09804 Manual Therapy 15 1   76699 ADL/COMP Tech Train     04401 Orthotic Management/Training      Other: MH                 Total  45 3       UPDATED Plan: OT 2x/week for up to 10 sessions    []  Continue Plan of care with focus on R hand splinting and HEP review.  Treatment covered based on POC and graduated to patient's progress. Pt education continues at each visit to obtain maximum benefits from skilled OT intervention.  []  Alter Plan of care: Continue OT POC with the addition of Goal #10 for 10 additional visits. Tx to focus on scar mgmt, stretches, ROM and functional use of the affected hand.   [x]  Discharge: with HEP and splinting      Anna Marie Hagan OT/LANE  [id number]

## 2025-03-24 RX ORDER — CEPHALEXIN 500 MG/1
500 CAPSULE ORAL 3 TIMES DAILY
Qty: 30 CAPSULE | Refills: 0 | Status: SHIPPED | OUTPATIENT
Start: 2025-03-24 | End: 2025-04-03

## 2025-04-25 ENCOUNTER — EVALUATION (OUTPATIENT)
Dept: OCCUPATIONAL THERAPY | Age: 50
End: 2025-04-25

## 2025-04-25 DIAGNOSIS — M67.843 OTHER SPECIFIED DISORDERS OF TENDON, RIGHT HAND: Primary | ICD-10-CM

## 2025-04-25 NOTE — PROGRESS NOTES
OCCUPATIONAL THERAPY INITIAL EVALUATION    [facility] OCCUPATIONAL THERAPY   [address] [place] [address]  Dept: [phone]  Loc: [phone]   [facility] OT Fax: [phone]    Date:  2025  Initial Evaluation Date: 25     Evaluating Therapist: Anna Marie Hagan OT    Patient Name:  Marbella Gardner    :  1975    Restrictions/Precautions:  Activity as tolerated, Low fall risk  Diagnosis:  M67.843 (ICD-10-CM) - Other specified disorders of tendon, right hand        Date of Surgery/Injury: R RF/ SF flexor tendon tenolysis and PIP contracture release  25    Insurance/Certification information: [place]/  [facility]  Plan of care signed (Y/N): N  Visit# / total visits: - visits    Referring Practitioner: Dr Brian Plunkett  Specific Practitioner Orders: OT evaluate and treat- PROM, AAROM, AROM, stretching, scar mgmt, strengthening and modalities as needed    Assessment of current deficits   [] Functional mobility  [x] ADLs  [x] Strength   [] Cognition   [] Functional transfers   [x] IADLs  [] Safety Awareness  [] Endurance   [x] Fine Motor Coordination  [] Balance  [] Vision/perception  [] Sensation    [] Gross Motor Coordination [x] ROM  [x] Pain   [x] Edema    [x] Scar Adhesion/Skin Integrity     OT PLAN OF CARE   OT POC based on physician orders, patient diagnosis and results of clinical assessment    Frequency/Duration: Daily OT x 2 weeks (10 visits) and 1-2x/ week x 4 addl weeks    Specific OT Treatment to include:   [x] Instruction in HEP                   Modalities:  [x] Therapeutic Exercise        [x] Ultrasound               [] Electrical Stimulation/Attended  [x] PROM/Stretching                    [x] Fluidotherapy          [x]  Paraffin                   [x] AAROM  [x] AROM                 [] Iontophoresis:   [x] Tendon Glides                                               [] Neuromuscular Re-Ed            [] ADL/IADL

## 2025-04-28 ENCOUNTER — TREATMENT (OUTPATIENT)
Dept: OCCUPATIONAL THERAPY | Age: 50
End: 2025-04-28
Payer: COMMERCIAL

## 2025-04-28 DIAGNOSIS — M67.843 OTHER SPECIFIED DISORDERS OF TENDON, RIGHT HAND: Primary | ICD-10-CM

## 2025-04-28 PROCEDURE — 97140 MANUAL THERAPY 1/> REGIONS: CPT | Performed by: OCCUPATIONAL THERAPIST

## 2025-04-28 PROCEDURE — 97530 THERAPEUTIC ACTIVITIES: CPT | Performed by: OCCUPATIONAL THERAPIST

## 2025-04-28 PROCEDURE — 97110 THERAPEUTIC EXERCISES: CPT | Performed by: OCCUPATIONAL THERAPIST

## 2025-04-28 NOTE — PROGRESS NOTES
Fluidotherapy     64541 Paraffin     18578 Ultrasound     28374 Electrical Stim - Attended     36450 Iontophoresis     20444 Therapeutic Ex 20 1   92630 Therapeutic Activity 15 1   10945 Neuromuscular Re-Ed     84529 Manual Therapy 25 2   40740 ADL/COMP Tech Train     26797 Orthotic Management/Training      Other                 Total          Plan: OT 1-2x/week for up to 18 sessions    [x]  Continues Plan of care with focus on wound care/ scar mgmt, ROM, stretches, and progressive functional activity. : Treatment covered based on POC and graduated to patient's progress. Pt education continues at each visit to obtain maximum benefits from skilled OT intervention.  []  Alter Plan of care:   []  Discharge:      Anna Marie Hagan OT /LANE  [id number]

## 2025-04-29 ENCOUNTER — TREATMENT (OUTPATIENT)
Dept: OCCUPATIONAL THERAPY | Age: 50
End: 2025-04-29
Payer: COMMERCIAL

## 2025-04-29 DIAGNOSIS — M67.843 OTHER SPECIFIED DISORDERS OF TENDON, RIGHT HAND: Primary | ICD-10-CM

## 2025-04-29 PROCEDURE — 97530 THERAPEUTIC ACTIVITIES: CPT | Performed by: OCCUPATIONAL THERAPIST

## 2025-04-29 PROCEDURE — 97140 MANUAL THERAPY 1/> REGIONS: CPT | Performed by: OCCUPATIONAL THERAPIST

## 2025-04-29 PROCEDURE — 97110 THERAPEUTIC EXERCISES: CPT | Performed by: OCCUPATIONAL THERAPIST

## 2025-04-29 NOTE — PROGRESS NOTES
OCCUPATIONAL THERAPY DAILY NOTE  [facility] PHYSICIANS [place] SPECIALTY [facility] OCCUPATIONAL THERAPY   [address] [place] [address]  Dept: [phone]  Loc: [phone]   [facility] OT Fax: [phone]      Date:  2025  Initial Evaluation Date: 25     Evaluating Therapist: Anna Marie Hagan OT    Patient Name:  Marbella Gardner    :  1975    Restrictions/Precautions:  Activity as tolerated, Low fall risk  Diagnosis:  M67.843 (ICD-10-CM) - Other specified disorders of tendon, right hand                                                           Date of Surgery/Injury: R RF/ SF flexor tendon tenolysis and PIP contracture release  25     Insurance/Certification information: [place]/  [facility]  Plan of care signed (Y/N): N  Visit# / total visits: - visits     Referring Practitioner: Dr Brian Plunkett  Specific Practitioner Orders: OT evaluate and treat- PROM, AAROM, AROM, stretching, scar mgmt, strengthening and modalities as needed     Assessment of current deficits   [] Functional mobility             [x] ADLs           [x] Strength                  [] Cognition   [] Functional transfers           [x] IADLs          [] Safety Awareness  [] Endurance   [x] Fine Motor Coordination    [] Balance      [] Vision/perception    [] Sensation     [] Gross Motor Coordination [x] ROM           [x] Pain                        [x] Edema          [x] Scar Adhesion/Skin Integrity      OT PLAN OF CARE   OT POC based on physician orders, patient diagnosis and results of clinical assessment     Frequency/Duration: Daily OT x 2 weeks (10 visits) and 1-2x/ week x 4 addl weeks     Specific OT Treatment to include:   [x] Instruction in HEP                   Modalities:  [x] Therapeutic Exercise                 [x] Ultrasound               [] Electrical Stimulation/Attended  [x] PROM/Stretching                    [x] Fluidotherapy          [x]  Paraffin                   [x] AAROM  [x]

## 2025-04-30 ENCOUNTER — TREATMENT (OUTPATIENT)
Dept: OCCUPATIONAL THERAPY | Age: 50
End: 2025-04-30
Payer: COMMERCIAL

## 2025-04-30 DIAGNOSIS — M67.843 OTHER SPECIFIED DISORDERS OF TENDON, RIGHT HAND: Primary | ICD-10-CM

## 2025-04-30 PROCEDURE — 97032 APPL MODALITY 1+ESTIM EA 15: CPT | Performed by: OCCUPATIONAL THERAPIST

## 2025-04-30 PROCEDURE — 97110 THERAPEUTIC EXERCISES: CPT | Performed by: OCCUPATIONAL THERAPIST

## 2025-04-30 PROCEDURE — 97140 MANUAL THERAPY 1/> REGIONS: CPT | Performed by: OCCUPATIONAL THERAPIST

## 2025-04-30 PROCEDURE — 97530 THERAPEUTIC ACTIVITIES: CPT | Performed by: OCCUPATIONAL THERAPIST

## 2025-04-30 NOTE — PROGRESS NOTES
OCCUPATIONAL THERAPY DAILY NOTE  [facility] PHYSICIANS [place] SPECIALTY [facility] OCCUPATIONAL THERAPY   [address] [place] [address]  Dept: [phone]  Loc: [phone]   [facility] OT Fax: [phone]      Date:  2025  Initial Evaluation Date: 25     Evaluating Therapist: Anna Marie Hagan OT    Patient Name:  Marbella Gardner    :  1975    Restrictions/Precautions:  Activity as tolerated, Low fall risk  Diagnosis:  M67.843 (ICD-10-CM) - Other specified disorders of tendon, right hand                                                           Date of Surgery/Injury: R RF/ SF flexor tendon tenolysis and PIP contracture release  25     Insurance/Certification information: [place]/  [facility]  Plan of care signed (Y/N): Y  Visit# / total visits: - visits     Referring Practitioner: Dr Brian Plunkett  Specific Practitioner Orders: OT evaluate and treat- PROM, AAROM, AROM, stretching, scar mgmt, strengthening and modalities as needed     Assessment of current deficits   [] Functional mobility             [x] ADLs           [x] Strength                  [] Cognition   [] Functional transfers           [x] IADLs          [] Safety Awareness  [] Endurance   [x] Fine Motor Coordination    [] Balance      [] Vision/perception    [] Sensation     [] Gross Motor Coordination [x] ROM           [x] Pain                        [x] Edema          [x] Scar Adhesion/Skin Integrity      OT PLAN OF CARE   OT POC based on physician orders, patient diagnosis and results of clinical assessment     Frequency/Duration: Daily OT x 2 weeks (10 visits) and 1-2x/ week x 4 addl weeks     Specific OT Treatment to include:   [x] Instruction in HEP                   Modalities:  [x] Therapeutic Exercise                 [x] Ultrasound               [] Electrical Stimulation/Attended  [x] PROM/Stretching                    [x] Fluidotherapy          [x]  Paraffin                   [x] AAROM  [x]

## 2025-05-01 ENCOUNTER — TREATMENT (OUTPATIENT)
Dept: OCCUPATIONAL THERAPY | Age: 50
End: 2025-05-01

## 2025-05-01 DIAGNOSIS — M67.843 OTHER SPECIFIED DISORDERS OF TENDON, RIGHT HAND: Primary | ICD-10-CM

## 2025-05-02 ENCOUNTER — TREATMENT (OUTPATIENT)
Dept: OCCUPATIONAL THERAPY | Age: 50
End: 2025-05-02

## 2025-05-02 DIAGNOSIS — M67.843 OTHER SPECIFIED DISORDERS OF TENDON, RIGHT HAND: Primary | ICD-10-CM

## 2025-05-02 NOTE — PROGRESS NOTES
TODAY'S TREATMENT     Pain Level: 2 on scale of 1-10, uncomfortable and with paresthesia    Subjective:  Pt presents with no new complaints.   Objective:    Updated POC to be completed by visit 10.    INTERVENTION: COMPLETED: SPECIFICS/COMMENTS:   Modality:     FES x PPR2 to enhance finger flexion/ tendon glide in the RF/ SF/ completed with AAROM and place and hold        AROM:     Hand AROM X  x  X  X  X    x - blocking ex at DIP and PIP SF/ RF 10x each  - isolated MCP flexion/ ext- 10x  - Isolated finger tip curls- 10x around red tubing  - composite flex AROM- lead with fingertip curls 5x  - Place and hold finger flexion as tolerated/ full hand and each joint  - towel scrunch and extend- 10x        PROM/Stretching:     PROM SF/ RF x - focus on each joint/ then composite ROM for flexion and extension        Scar Mass/Edema Control:               Strengthening:               Other:     Splint check x - addl strap provided to distal RF to ^ proper alignment   Dressing changes x - completed after wound care   Wound care X  X  x - incision cleaned with saline  - light bleeding with ROM/ no drainage  - 1 area of swelling/ redness at the distal portion of the RF incision/ will monitor/ no seepage   HEP X  X  X  X  x - PROM for flexion/ ext in RF/ SF  - Blocking ex at DIP/ PIPs of RF/ SF  - Isolated MCP flex/ ext  - Finger tip curls with red tubing  -Daily dressing changes/ wound care     Assessment/Comments: ROM and stretches continued with a focus on tendon glides. Digital flexion in the SF is improving nicely. PIP flex/ ext is emerging in the RF but DIP activation is difficult. Quick fatigue is noted during session. Pt is encouraged to  use the full hand over the weekend with splint use at night. If the digits start to tighten into flexion, the pt is encouraged for day time splint use as needed. Will continue.     -Rehab Potential: Good   -Patient Response to Treatment: Pt is motivated for recovery.     Patient. 
no

## 2025-05-02 NOTE — PROGRESS NOTES
Demonstrates/verbalizes understanding of HEP/Ed previously given.      Time In: 1605            Time Out:  1705          CODE  Minutes  Units   35957 Fluidotherapy     78100 Paraffin     16532 Ultrasound     42458 Electrical Stim - Attended     70122 Iontophoresis     36574 Therapeutic Ex 20 1   66670 Therapeutic Activity 10 1   08085 Neuromuscular Re-Ed     92185 Manual Therapy 30 2   05874 ADL/COMP Tech Train     52495 Orthotic Management/Training      Other                 Total  60 4       Plan: OT 1-2x/week for up to 18 sessions    [x]  Continues Plan of care with focus on wound care/ scar mgmt, tendon glides, ROM, stretches, and progressive functional activity. : Treatment covered based on POC and graduated to patient's progress. Pt education continues at each visit to obtain maximum benefits from skilled OT intervention.  []  Alter Plan of care:   []  Discharge:      Anna Marie Hagan OT /LANE  [id number]

## 2025-05-05 ENCOUNTER — TREATMENT (OUTPATIENT)
Dept: OCCUPATIONAL THERAPY | Age: 50
End: 2025-05-05
Payer: COMMERCIAL

## 2025-05-05 DIAGNOSIS — M67.843 OTHER SPECIFIED DISORDERS OF TENDON, RIGHT HAND: Primary | ICD-10-CM

## 2025-05-05 PROCEDURE — 97760 ORTHOTIC MGMT&TRAING 1ST ENC: CPT | Performed by: OCCUPATIONAL THERAPIST

## 2025-05-05 PROCEDURE — 97110 THERAPEUTIC EXERCISES: CPT | Performed by: OCCUPATIONAL THERAPIST

## 2025-05-05 PROCEDURE — 97140 MANUAL THERAPY 1/> REGIONS: CPT | Performed by: OCCUPATIONAL THERAPIST

## 2025-05-05 NOTE — PROGRESS NOTES
TODAY'S TREATMENT     Pain Level: 1 on scale of 1-10, uncomfortable and with paresthesia    Subjective:  Pt presents following partial suture removal.  Objective:    Updated POC to be completed by visit 10.    INTERVENTION: COMPLETED: SPECIFICS/COMMENTS:   Modality:     FES  PPR2 to enhance finger flexion/ tendon glide in the RF/ SF/ completed with AAROM and place and hold        AROM:     Hand AROM X  x  X  X  X    x - blocking ex at DIP and PIP SF/ RF 10x each  - isolated MCP flexion/ ext- 10x  - Isolated finger tip curls- 10x around red tubing  - composite flex AROM- lead with fingertip curls 5x  - Place and hold finger flexion as tolerated/ full hand and each joint  - towel scrunch and extend- 10x        PROM/Stretching:     PROM SF/ RF x - focus on each joint/ then composite ROM for flexion and extension        Scar Mass/Edema Control:               Strengthening:     Hand strengthening  - Hand held with attention to PIP/ DIP ROM- 2 yellow bands--> continue at home        Other:     Splint check   x - addl strap provided to distal RF to ^ proper alignment  -NEW individual gutter splints fabricated for the RF/ SF over tubing   Dressing changes x - completed after wound care   Wound care X  X  x - incision cleaned with saline  - light bleeding with ROM/ no drainage  - 1 area of swelling/ redness at the distal portion of the RF incision/ will monitor/ no seepage   HEP X  X  X  X  X  x - PROM for flexion/ ext in RF/ SF  - Blocking ex at DIP/ PIPs of RF/ SF  - Isolated MCP flex/ ext  - Finger tip curls with red tubing  -Daily dressing changes/ wound care  - Use of buddy strap for ROM     Assessment/Comments: ROM and stretches continued . New gutter splint fabricated to replace hand based splint. Splint wear is as needed during the day and on for night time. Pt is encouraged to use the full hand during the day. Splint use is to ^ during the day if the pt notes re-occurrence of finger flexion tightness. Will

## 2025-05-06 ENCOUNTER — TREATMENT (OUTPATIENT)
Dept: OCCUPATIONAL THERAPY | Age: 50
End: 2025-05-06
Payer: COMMERCIAL

## 2025-05-06 DIAGNOSIS — M67.843 OTHER SPECIFIED DISORDERS OF TENDON, RIGHT HAND: Primary | ICD-10-CM

## 2025-05-06 PROCEDURE — 97530 THERAPEUTIC ACTIVITIES: CPT | Performed by: OCCUPATIONAL THERAPIST

## 2025-05-06 PROCEDURE — 97110 THERAPEUTIC EXERCISES: CPT | Performed by: OCCUPATIONAL THERAPIST

## 2025-05-06 PROCEDURE — 97140 MANUAL THERAPY 1/> REGIONS: CPT | Performed by: OCCUPATIONAL THERAPIST

## 2025-05-06 NOTE — PROGRESS NOTES
OCCUPATIONAL THERAPY DAILY NOTE  [facility] PHYSICIANS [place] SPECIALTY [facility] OCCUPATIONAL THERAPY   [address] [place] [address]  Dept: [phone]  Loc: [phone]   [facility] OT Fax: [phone]      Date:  2025  Initial Evaluation Date: 25     Evaluating Therapist: Anna Marie Hagan OT    Patient Name:  Marbella Gardner    :  1975    Restrictions/Precautions:  Activity as tolerated, Low fall risk  Diagnosis:  M67.843 (ICD-10-CM) - Other specified disorders of tendon, right hand                                                           Date of Surgery/Injury: R RF/ SF flexor tendon tenolysis and PIP contracture release  25     Ins[place]/Certification information[place]/  [facility]  Plan of care signed (Y/N): Y  Visit# / total visits: - visits ( visit number correction)     Referring Practitioner: Dr Brian Plunkett  Specific Practitioner Orders: OT evaluate and treat- PROM, AAROM, AROM, stretching, scar mgmt, strengthening and modalities as needed     Assessment of current deficits   [] Functional mobility             [x] ADLs           [x] Strength                  [] Cognition   [] Functional transfers           [x] IADLs          [] Safety Awareness  [] Endurance   [x] Fine Motor Coordination    [] Balance      [] Vision/perception    [] Sensation     [] Gross Motor Coordination [x] ROM           [x] Pain                        [x] Edema          [x] Scar Adhesion/Skin Integrity      OT PLAN OF CARE   OT POC based on physician orders, patient diagnosis and results of clinical assessment     Frequency/Duration: Daily OT x 2 weeks (10 visits) and 1-2x/ week x 4 addl weeks     Specific OT Treatment to include:   [x] Instruction in HEP                   Modalities:  [x] Therapeutic Exercise                 [x] Ultrasound               [] Electrical Stimulation/Attended  [x] PROM/Stretching                    [x] Fluidotherapy          [x]  Paraffin

## 2025-05-07 ENCOUNTER — TREATMENT (OUTPATIENT)
Dept: OCCUPATIONAL THERAPY | Age: 50
End: 2025-05-07
Payer: COMMERCIAL

## 2025-05-07 DIAGNOSIS — M67.843 OTHER SPECIFIED DISORDERS OF TENDON, RIGHT HAND: Primary | ICD-10-CM

## 2025-05-07 PROCEDURE — 97530 THERAPEUTIC ACTIVITIES: CPT | Performed by: OCCUPATIONAL THERAPIST

## 2025-05-07 PROCEDURE — 97140 MANUAL THERAPY 1/> REGIONS: CPT | Performed by: OCCUPATIONAL THERAPIST

## 2025-05-07 PROCEDURE — 97110 THERAPEUTIC EXERCISES: CPT | Performed by: OCCUPATIONAL THERAPIST

## 2025-05-07 NOTE — PROGRESS NOTES
OCCUPATIONAL THERAPY DAILY NOTE  [facility] PHYSICIANS [place] SPECIALTY [facility] OCCUPATIONAL THERAPY   [address] [place] [address]  Dept: [phone]  Loc: [phone]   [facility] OT Fax: [phone]      Date:  2025  Initial Evaluation Date: 25     Evaluating Therapist: Anna Marie Hagan OT    Patient Name:  Marbella Gardner    :  1975    Restrictions/Precautions:  Activity as tolerated, Low fall risk  Diagnosis:  M67.843 (ICD-10-CM) - Other specified disorders of tendon, right hand                                                           Date of Surgery/Injury: R RF/ SF flexor tendon tenolysis and PIP contracture release  25     Insurance/Certification information: [place]/  [facility]  Plan of care signed (Y/N): Y  Visit# / total visits: - visits      Referring Practitioner: Dr Brian Plunkett  Specific Practitioner Orders: OT evaluate and treat- PROM, AAROM, AROM, stretching, scar mgmt, strengthening and modalities as needed     Assessment of current deficits   [] Functional mobility             [x] ADLs           [x] Strength                  [] Cognition   [] Functional transfers           [x] IADLs          [] Safety Awareness  [] Endurance   [x] Fine Motor Coordination    [] Balance      [] Vision/perception    [] Sensation     [] Gross Motor Coordination [x] ROM           [x] Pain                        [x] Edema          [x] Scar Adhesion/Skin Integrity      OT PLAN OF CARE   OT POC based on physician orders, patient diagnosis and results of clinical assessment     Frequency/Duration: Daily OT x 2 weeks (10 visits) and 1-2x/ week x 4 addl weeks     Specific OT Treatment to include:   [x] Instruction in HEP                   Modalities:  [x] Therapeutic Exercise                 [x] Ultrasound               [] Electrical Stimulation/Attended  [x] PROM/Stretching                    [x] Fluidotherapy          [x]  Paraffin                   [x] AAROM  [x]

## 2025-05-08 ENCOUNTER — TREATMENT (OUTPATIENT)
Dept: OCCUPATIONAL THERAPY | Age: 50
End: 2025-05-08
Payer: COMMERCIAL

## 2025-05-08 DIAGNOSIS — M67.843 OTHER SPECIFIED DISORDERS OF TENDON, RIGHT HAND: Primary | ICD-10-CM

## 2025-05-08 PROCEDURE — 97110 THERAPEUTIC EXERCISES: CPT | Performed by: OCCUPATIONAL THERAPIST

## 2025-05-08 PROCEDURE — 97140 MANUAL THERAPY 1/> REGIONS: CPT | Performed by: OCCUPATIONAL THERAPIST

## 2025-05-08 PROCEDURE — 97035 APP MDLTY 1+ULTRASOUND EA 15: CPT | Performed by: OCCUPATIONAL THERAPIST

## 2025-05-08 NOTE — PROGRESS NOTES
TODAY'S TREATMENT     Pain Level: 1 on scale of 1-10, uncomfortable and with paresthesia    Subjective:  Pt presents with ^ swelling over the volar wrist with tenderness.  Objective:    Updated POC to be completed by visit 10.    INTERVENTION: COMPLETED: SPECIFICS/COMMENTS:   Modality:     FES  PPR2 - 9.5 to enhance finger flexion/ tendon glide in the RF/ SF/ completed with AAROM and place and hold   US volar wrist x  8 min to decrease swelling/ discomfort- 100% .8   AROM:     Hand AROM X  x  X  X  X           - blocking ex at DIP and PIP SF/ RF 10x each  - isolated MCP flexion/ ext- 10x  - Isolated finger tip curls- 10x around red tubing  - composite flex AROM- lead with fingertip curls 5x  - Place and hold finger flexion as tolerated/ full hand and each joint  - towel scrunch and extend- 10x  - full tendon glides with assistance for positioning as needed  - exercise balls 20x each direction   Functional hand ROM ex    - grasp/ release beans  - grasp release small cones with attn RF/ SF   PROM/Stretching:     PROM SF/ RF x - focus on each joint/ then composite ROM for flexion and extension        Scar Mass/Edema Control:     Scar massage   - volar wrist        Strengthening:     Hand strengthening  - Hand held with attention to PIP/ DIP ROM- 2 yellow bands--> continue at home        Other:     Splint check   X    x - addl strap provided to distal RF to ^ proper alignment  -individual gutter splints fabricated for the RF/ SF over tubing--> tolerating well  - NEW- Small LMB extension splint provided for SF--> use as tolerated   Dressing changes x - completed after wound care   Wound care   X  x - incision cleaned with saline  - no bleeding with ROM/ no drainage  - 1 area of swelling/ redness at the distal portion of the RF incision/ will monitor/ no seepage   HEP X  X  X  X  X  X  x - PROM for flexion/ ext in RF/ SF  - Blocking ex at DIP/ PIPs of RF/ SF  - Isolated MCP flex/ ext  - Finger tip curls with red

## 2025-05-09 ENCOUNTER — TREATMENT (OUTPATIENT)
Dept: OCCUPATIONAL THERAPY | Age: 50
End: 2025-05-09

## 2025-05-09 DIAGNOSIS — M67.843 OTHER SPECIFIED DISORDERS OF TENDON, RIGHT HAND: Primary | ICD-10-CM

## 2025-05-09 NOTE — PROGRESS NOTES
OCCUPATIONAL THERAPY DAILY NOTE  [facility] PHYSICIANS [place] SPECIALTY [facility] OCCUPATIONAL THERAPY   [address] [place] [address]  Dept: [phone]  Loc: [phone]   [facility] OT Fax: [phone]      Date:  2025  Initial Evaluation Date: 25     Evaluating Therapist: Anna Marie Hagan OT    Patient Name:  Marbella Gardner    :  1975    Restrictions/Precautions:  Activity as tolerated, Low fall risk  Diagnosis:  M67.843 (ICD-10-CM) - Other specified disorders of tendon, right hand                                                           Date of Surgery/Injury: R RF/ SF flexor tendon tenolysis and PIP contracture release  25     Insurance/Certification information: [place]/  [facility]  Plan of care signed (Y/N): Y  Visit# / total visits: 10/ 14- visits      Referring Practitioner: Dr Brian Plunkett  Specific Practitioner Orders: OT evaluate and treat- PROM, AAROM, AROM, stretching, scar mgmt, strengthening and modalities as needed     Assessment of current deficits   [] Functional mobility             [x] ADLs           [x] Strength                  [] Cognition   [] Functional transfers           [x] IADLs          [] Safety Awareness  [] Endurance   [x] Fine Motor Coordination    [] Balance      [] Vision/perception    [] Sensation     [] Gross Motor Coordination [x] ROM           [x] Pain                        [x] Edema          [x] Scar Adhesion/Skin Integrity      OT PLAN OF CARE   OT POC based on physician orders, patient diagnosis and results of clinical assessment     Frequency/Duration: Daily OT x 2 weeks (10 visits) and 1-2x/ week x 4 addl weeks     Specific OT Treatment to include:   [x] Instruction in HEP                   Modalities:  [x] Therapeutic Exercise                 [x] Ultrasound               [] Electrical Stimulation/Attended  [x] PROM/Stretching                    [x] Fluidotherapy          [x]  Paraffin                   [x] AAROM  [x]

## 2025-05-12 ENCOUNTER — TREATMENT (OUTPATIENT)
Dept: OCCUPATIONAL THERAPY | Age: 50
End: 2025-05-12
Payer: COMMERCIAL

## 2025-05-12 DIAGNOSIS — M67.843 OTHER SPECIFIED DISORDERS OF TENDON, RIGHT HAND: Primary | ICD-10-CM

## 2025-05-12 PROCEDURE — 97110 THERAPEUTIC EXERCISES: CPT | Performed by: OCCUPATIONAL THERAPIST

## 2025-05-12 PROCEDURE — 97140 MANUAL THERAPY 1/> REGIONS: CPT | Performed by: OCCUPATIONAL THERAPIST

## 2025-05-12 PROCEDURE — 97530 THERAPEUTIC ACTIVITIES: CPT | Performed by: OCCUPATIONAL THERAPIST

## 2025-05-12 NOTE — PROGRESS NOTES
OCCUPATIONAL THERAPY DAILY NOTE  [facility] PHYSICIANS [place] SPECIALTY [facility] OCCUPATIONAL THERAPY   [address] [place] [address]  Dept: [phone]  Loc: [phone]   [facility] OT Fax: [phone]      Date:  2025  Initial Evaluation Date: 25     Evaluating Therapist: Anna Marie Hagan OT    Patient Name:  Marbella Gardner    :  1975    Restrictions/Precautions:  Activity as tolerated, Low fall risk  Diagnosis:  M67.843 (ICD-10-CM) - Other specified disorders of tendon, right hand                                                           Date of Surgery/Injury: R RF/ SF flexor tendon tenolysis and PIP contracture release  25     Insurance/Certification information: [place]/  [facility]  Plan of care signed (Y/N): Y  Visit# / total visits: - visits      Referring Practitioner: Dr Brian Plunkett  Specific Practitioner Orders: OT evaluate and treat- PROM, AAROM, AROM, stretching, scar mgmt, strengthening and modalities as needed     Assessment of current deficits   [] Functional mobility             [x] ADLs           [x] Strength                  [] Cognition   [] Functional transfers           [x] IADLs          [] Safety Awareness  [] Endurance   [x] Fine Motor Coordination    [] Balance      [] Vision/perception    [] Sensation     [] Gross Motor Coordination [x] ROM           [x] Pain                        [x] Edema          [x] Scar Adhesion/Skin Integrity      OT PLAN OF CARE   OT POC based on physician orders, patient diagnosis and results of clinical assessment     Frequency/Duration: Daily OT x 2 weeks (10 visits) and 1-2x/ week x 4 addl weeks     Specific OT Treatment to include:   [x] Instruction in HEP                   Modalities:  [x] Therapeutic Exercise                 [x] Ultrasound               [] Electrical Stimulation/Attended  [x] PROM/Stretching                    [x] Fluidotherapy          [x]  Paraffin                   [x] AAROM  [x]

## 2025-05-14 ENCOUNTER — TREATMENT (OUTPATIENT)
Dept: OCCUPATIONAL THERAPY | Age: 50
End: 2025-05-14
Payer: COMMERCIAL

## 2025-05-14 DIAGNOSIS — M67.843 OTHER SPECIFIED DISORDERS OF TENDON, RIGHT HAND: Primary | ICD-10-CM

## 2025-05-14 PROCEDURE — 97530 THERAPEUTIC ACTIVITIES: CPT | Performed by: OCCUPATIONAL THERAPIST

## 2025-05-14 PROCEDURE — 97110 THERAPEUTIC EXERCISES: CPT | Performed by: OCCUPATIONAL THERAPIST

## 2025-05-14 PROCEDURE — 97140 MANUAL THERAPY 1/> REGIONS: CPT | Performed by: OCCUPATIONAL THERAPIST

## 2025-05-14 NOTE — PROGRESS NOTES
OCCUPATIONAL THERAPY DAILY NOTE  [facility] PHYSICIANS [employer] SPECIALTY [facility] OCCUPATIONAL THERAPY   [address] [place] [address]  Dept: [phone]  Loc: [phone]   [facility] OT Fax: [phone]      Date:  2025  Initial Evaluation Date: 25     Evaluating Therapist: Anna Marie Hagan OT    Patient Name:  Marbella Gardner    :  1975    Restrictions/Precautions:  Activity as tolerated, Low fall risk  Diagnosis:  M67.843 (ICD-10-CM) - Other specified disorders of tendon, right hand                                                           Date of Surgery/Injury: R RF/ SF flexor tendon tenolysis and PIP contracture release  25     Insurance/Certification information: [place]/  [facility]  Plan of care signed (Y/N): Y  Visit# / total visits: -18 visits      Referring Practitioner: Dr Brian Plunkett  Specific Practitioner Orders: OT evaluate and treat- PROM, AAROM, AROM, stretching, scar mgmt, strengthening and modalities as needed     Assessment of current deficits   [] Functional mobility             [x] ADLs           [x] Strength                  [] Cognition   [] Functional transfers           [x] IADLs          [] Safety Awareness  [] Endurance   [x] Fine Motor Coordination    [] Balance      [] Vision/perception    [] Sensation     [] Gross Motor Coordination [x] ROM           [x] Pain                        [x] Edema          [x] Scar Adhesion/Skin Integrity      OT PLAN OF CARE   OT POC based on physician orders, patient diagnosis and results of clinical assessment     Frequency/Duration: Daily OT x 2 weeks (10 visits) and 1-2x/ week x 4 addl weeks     Specific OT Treatment to include:   [x] Instruction in HEP                   Modalities:  [x] Therapeutic Exercise                 [x] Ultrasound               [] Electrical Stimulation/Attended  [x] PROM/Stretching                    [x] Fluidotherapy          [x]  Paraffin                   [x] AAROM  [x]

## 2025-05-16 ENCOUNTER — TREATMENT (OUTPATIENT)
Dept: OCCUPATIONAL THERAPY | Age: 50
End: 2025-05-16

## 2025-05-16 DIAGNOSIS — M67.843 OTHER SPECIFIED DISORDERS OF TENDON, RIGHT HAND: Primary | ICD-10-CM

## 2025-05-16 NOTE — PROGRESS NOTES
daily use of the hand)  6) Prevent joint contractures in the R RF/ SF via ROM, stretches and splint efforts as needed. (Ongoing- pt has static and dynamic splints to prevent contracture re-occurrence)   7) Patient will demonstrate a non-tender/non-adherent scar. (Progress noted- sutures were removed on 5-12-25. Scar massage initiated today)  8) Patient will report ADL functions as Mod I/I using the full right hand effectively.  (Ongoing)      TODAY'S TREATMENT     Pain Level: 3/10 in the R wrist  Subjective:  Pt says her wrist tends to hurt when she is doing computer work.   Objective:    Updated POC to be completed by last visit.    INTERVENTION: COMPLETED: SPECIFICS/COMMENTS:   Modality:     FES  PPR2 - 9.5 to enhance finger flexion/ tendon glide in the RF/ SF/ completed with AAROM and place and hold   US volar wrist   8 min to decrease swelling/ discomfort- 100% .8   AROM:     Hand AROM X    X    X  X    x       - blocking ex at DIP and PIP SF/ RF 10x each  - isolated MCP flexion/ ext- 10x  - Isolated finger tip curls- 10x around red and smaller brown tubing  - composite flex AROM- lead with fingertip curls 5x  - Place and hold finger flexion as tolerated/ full hand and each joint  - towel scrunch and extend- 10x  - full tendon glides with assistance for positioning as needed  - exercise balls 20x each direction   Functional hand ROM ex    - grasp/ release beans  - grasp release small cones with attn RF/ SF   PROM/Stretching:     PROM SF/ RF x - focus on each joint/ then composite ROM  and stretches for flexion and extension        Manual techniques     Scar massage / soft tissue mob X  x - soft tissue volar wrist  - scar massage manual and with vibration   Joint mobilization x - PIP of RF/ SF   Strengthening:     Hand strengthening  - Hand held with attention to PIP/ DIP ROM- 2 yellow bands--> continue at home  - 2# yelloe digiflex composite and each digit as tolerated        Other:     Splint check   X    x -

## 2025-05-19 ENCOUNTER — TREATMENT (OUTPATIENT)
Dept: OCCUPATIONAL THERAPY | Age: 50
End: 2025-05-19
Payer: COMMERCIAL

## 2025-05-19 DIAGNOSIS — M67.843 OTHER SPECIFIED DISORDERS OF TENDON, RIGHT HAND: Primary | ICD-10-CM

## 2025-05-19 PROCEDURE — 97018 PARAFFIN BATH THERAPY: CPT | Performed by: OCCUPATIONAL THERAPIST

## 2025-05-19 PROCEDURE — 97035 APP MDLTY 1+ULTRASOUND EA 15: CPT | Performed by: OCCUPATIONAL THERAPIST

## 2025-05-19 PROCEDURE — 97110 THERAPEUTIC EXERCISES: CPT | Performed by: OCCUPATIONAL THERAPIST

## 2025-05-19 PROCEDURE — 97140 MANUAL THERAPY 1/> REGIONS: CPT | Performed by: OCCUPATIONAL THERAPIST

## 2025-05-19 NOTE — PROGRESS NOTES
benefits from skilled OT intervention.  []  Alter Plan of care:   []  Discharge:      Anna Marie Hagan OT /L  [id number]

## 2025-05-21 ENCOUNTER — TREATMENT (OUTPATIENT)
Dept: OCCUPATIONAL THERAPY | Age: 50
End: 2025-05-21
Payer: COMMERCIAL

## 2025-05-21 DIAGNOSIS — M67.843 OTHER SPECIFIED DISORDERS OF TENDON, RIGHT HAND: Primary | ICD-10-CM

## 2025-05-21 PROCEDURE — 97140 MANUAL THERAPY 1/> REGIONS: CPT | Performed by: OCCUPATIONAL THERAPIST

## 2025-05-21 PROCEDURE — 97530 THERAPEUTIC ACTIVITIES: CPT | Performed by: OCCUPATIONAL THERAPIST

## 2025-05-21 PROCEDURE — 97018 PARAFFIN BATH THERAPY: CPT | Performed by: OCCUPATIONAL THERAPIST

## 2025-05-21 PROCEDURE — 97110 THERAPEUTIC EXERCISES: CPT | Performed by: OCCUPATIONAL THERAPIST

## 2025-05-21 NOTE — PROGRESS NOTES
OCCUPATIONAL THERAPY DAILY NOTE  [facility] PHYSICIANS [place] SPECIALTY [facility] OCCUPATIONAL THERAPY   [address] [place] [address]  Dept: [phone]  Loc: [phone]   [facility] OT Fax: [phone]      Date:  2025  Initial Evaluation Date: 25     Evaluating Therapist: Anna Marie Hagan OT    Patient Name:  Marbella Gardner    :  1975    Restrictions/Precautions:  Activity as tolerated, Low fall risk  Diagnosis:  M67.843 (ICD-10-CM) - Other specified disorders of tendon, right hand                                                           Date of Surgery/Injury: R RF/ SF flexor tendon tenolysis and PIP contracture release  25     Insurance/Certification information: [place]/  [facility]  Plan of care signed (Y/N): Y  Visit# / total visits: 15/ 14- visits      Referring Practitioner: Dr Brian Plunkett  Specific Practitioner Orders: OT evaluate and treat- PROM, AAROM, AROM, stretching, scar mgmt, strengthening and modalities as needed     Assessment of current deficits   [] Functional mobility             [x] ADLs           [x] Strength                  [] Cognition   [] Functional transfers           [x] IADLs          [] Safety Awareness  [] Endurance   [x] Fine Motor Coordination    [] Balance      [] Vision/perception    [] Sensation     [] Gross Motor Coordination [x] ROM           [x] Pain                        [x] Edema          [x] Scar Adhesion/Skin Integrity      OT PLAN OF CARE   OT POC based on physician orders, patient diagnosis and results of clinical assessment     Frequency/Duration: Daily OT x 2 weeks (10 visits) and 1-2x/ week x 4 addl weeks     Specific OT Treatment to include:   [x] Instruction in HEP                   Modalities:  [x] Therapeutic Exercise                 [x] Ultrasound               [] Electrical Stimulation/Attended  [x] PROM/Stretching                    [x] Fluidotherapy          [x]  Paraffin                   [x] AAROM  [x]

## 2025-05-29 ENCOUNTER — TREATMENT (OUTPATIENT)
Dept: OCCUPATIONAL THERAPY | Age: 50
End: 2025-05-29
Payer: COMMERCIAL

## 2025-05-29 DIAGNOSIS — M67.843 OTHER SPECIFIED DISORDERS OF TENDON, RIGHT HAND: Primary | ICD-10-CM

## 2025-05-29 PROCEDURE — 97110 THERAPEUTIC EXERCISES: CPT | Performed by: OCCUPATIONAL THERAPIST

## 2025-05-29 PROCEDURE — 97530 THERAPEUTIC ACTIVITIES: CPT | Performed by: OCCUPATIONAL THERAPIST

## 2025-05-29 PROCEDURE — 97140 MANUAL THERAPY 1/> REGIONS: CPT | Performed by: OCCUPATIONAL THERAPIST

## 2025-05-29 PROCEDURE — 97018 PARAFFIN BATH THERAPY: CPT | Performed by: OCCUPATIONAL THERAPIST

## 2025-05-29 NOTE — PROGRESS NOTES
OCCUPATIONAL THERAPY DAILY NOTE  [facility] PHYSICIANS [place] SPECIALTY [facility] OCCUPATIONAL THERAPY   [address] [place] [address]  Dept: [phone]  Loc: [phone]   [facility] OT Fax: [phone]      Date:  2025  Initial Evaluation Date: 25     Evaluating Therapist: Anna Marie Hagan OT    Patient Name:  Marbella Gardner    :  1975    Restrictions/Precautions:  Activity as tolerated, Low fall risk  Diagnosis:  M67.843 (ICD-10-CM) - Other specified disorders of tendon, right hand                                                           Date of Surgery/Injury: R RF/ SF flexor tendon tenolysis and PIP contracture release  25     Insurance/Certification information: [place]/  [facility]  Plan of care signed (Y/N): Y  Visit# / total visits: -18 visits      Referring Practitioner: Dr Brian Plunkett  Specific Practitioner Orders: OT evaluate and treat- PROM, AAROM, AROM, stretching, scar mgmt, strengthening and modalities as needed     Assessment of current deficits   [] Functional mobility             [x] ADLs           [x] Strength                  [] Cognition   [] Functional transfers           [x] IADLs          [] Safety Awareness  [] Endurance   [x] Fine Motor Coordination    [] Balance      [] Vision/perception    [] Sensation     [] Gross Motor Coordination [x] ROM           [x] Pain                        [x] Edema          [x] Scar Adhesion/Skin Integrity      OT PLAN OF CARE   OT POC based on physician orders, patient diagnosis and results of clinical assessment     Frequency/Duration: Daily OT x 2 weeks (10 visits) and 1-2x/ week x 4 addl weeks     Specific OT Treatment to include:   [x] Instruction in HEP                   Modalities:  [x] Therapeutic Exercise                 [x] Ultrasound               [] Electrical Stimulation/Attended  [x] PROM/Stretching                    [x] Fluidotherapy          [x]  Paraffin                   [x] AAROM  [x]

## 2025-06-02 ENCOUNTER — TREATMENT (OUTPATIENT)
Dept: OCCUPATIONAL THERAPY | Age: 50
End: 2025-06-02
Payer: COMMERCIAL

## 2025-06-02 DIAGNOSIS — M67.843 OTHER SPECIFIED DISORDERS OF TENDON, RIGHT HAND: Primary | ICD-10-CM

## 2025-06-02 PROCEDURE — 97110 THERAPEUTIC EXERCISES: CPT | Performed by: OCCUPATIONAL THERAPIST

## 2025-06-02 PROCEDURE — 97140 MANUAL THERAPY 1/> REGIONS: CPT | Performed by: OCCUPATIONAL THERAPIST

## 2025-06-02 PROCEDURE — 97018 PARAFFIN BATH THERAPY: CPT | Performed by: OCCUPATIONAL THERAPIST

## 2025-06-02 PROCEDURE — 97530 THERAPEUTIC ACTIVITIES: CPT | Performed by: OCCUPATIONAL THERAPIST

## 2025-06-02 NOTE — PROGRESS NOTES
Review.  [] Demonstrates/verbalizes understanding of HEP/Ed previously given.      Time In: 1100    Time Out:1200     CODE  Minutes  Units   49224 Fluidotherapy     29760 Paraffin 10 1   14781 Ultrasound 5 0   41079 Electrical Stim - Attended     11923 Iontophoresis     16513 Therapeutic Ex 15 1   98903 Therapeutic Activity 10 1   64742 Neuromuscular Re-Ed     30575 Manual Therapy 20 1   08366 ADL/COMP Tech Train     33059 Orthotic Management/Training      Other: MH                 Total  60 4       Plan: OT 1-2x/week for up to 18 sessions    [x]  Continue Plan of care with focus on wound care/ scar mgmt, tendon glides, ROM, stretches, and progressive functional activity/ strengthening. : Treatment covered based on POC and graduated to patient's progress. Pt education continues at each visit to obtain maximum benefits from skilled OT intervention.  []  Alter Plan of care:   []  Discharge:      Anna Marie Hagan OT /LANE  [id number]

## 2025-06-09 ENCOUNTER — TREATMENT (OUTPATIENT)
Dept: OCCUPATIONAL THERAPY | Age: 50
End: 2025-06-09
Payer: COMMERCIAL

## 2025-06-09 DIAGNOSIS — M67.843 OTHER SPECIFIED DISORDERS OF TENDON, RIGHT HAND: Primary | ICD-10-CM

## 2025-06-09 PROCEDURE — 97140 MANUAL THERAPY 1/> REGIONS: CPT | Performed by: OCCUPATIONAL THERAPIST

## 2025-06-09 PROCEDURE — 97018 PARAFFIN BATH THERAPY: CPT | Performed by: OCCUPATIONAL THERAPIST

## 2025-06-09 PROCEDURE — 97110 THERAPEUTIC EXERCISES: CPT | Performed by: OCCUPATIONAL THERAPIST

## 2025-06-09 PROCEDURE — 97530 THERAPEUTIC ACTIVITIES: CPT | Performed by: OCCUPATIONAL THERAPIST

## 2025-06-09 NOTE — PROGRESS NOTES
OCCUPATIONAL THERAPY DAILY NOTE/ DISCHARGE NOTE  [facility] PHYSICIANS [place] [facility] OCCUPATIONAL THERAPY   [address]  [place] [address]  Dept: [phone]  Loc: [phone]   [facility] OT Fax: [phone]      Date:  2025  Initial Evaluation Date: 25     Evaluating Therapist: Anna Marie Hagan OT    Patient Name:  Marbella Gardner    :  1975    Restrictions/Precautions:  Activity as tolerated, Low fall risk  Diagnosis:  M67.843 (ICD-10-CM) - Other specified disorders of tendon, right hand                                                           Date of Surgery/Injury: R RF/ SF flexor tendon tenolysis and PIP contracture release  25     Insurance/Certification information: [place]/  [facility]  Plan of care signed (Y/N): Y  Visit# / total visits: - visits      Referring Practitioner: Dr Brian Plunkett  Specific Practitioner Orders: OT evaluate and treat- PROM, AAROM, AROM, stretching, scar mgmt, strengthening and modalities as needed     Assessment of current deficits   [] Functional mobility             [x] ADLs           [x] Strength                  [] Cognition   [] Functional transfers           [x] IADLs          [] Safety Awareness  [] Endurance   [x] Fine Motor Coordination    [] Balance      [] Vision/perception    [] Sensation     [] Gross Motor Coordination [x] ROM           [x] Pain                        [x] Edema          [x] Scar Adhesion/Skin Integrity      OT PLAN OF CARE   OT POC based on physician orders, patient diagnosis and results of clinical assessment     Frequency/Duration: Daily OT x 2 weeks (10 visits) and 1-2x/ week x 4 addl weeks     Specific OT Treatment to include:   [x] Instruction in HEP                   Modalities:  [x] Therapeutic Exercise                 [x] Ultrasound               [] Electrical Stimulation/Attended  [x] PROM/Stretching                    [x] Fluidotherapy          [x]  Paraffin

## 2025-06-11 DIAGNOSIS — G47.00 INSOMNIA, UNSPECIFIED TYPE: ICD-10-CM

## 2025-06-11 RX ORDER — TRAZODONE HYDROCHLORIDE 50 MG/1
50 TABLET ORAL NIGHTLY PRN
Qty: 30 TABLET | Refills: 3 | Status: SHIPPED | OUTPATIENT
Start: 2025-06-11

## 2025-06-11 NOTE — TELEPHONE ENCOUNTER
Last seen 11/20/2024  Next appt Visit date not found    Requested Prescriptions     Pending Prescriptions Disp Refills    traZODone (DESYREL) 50 MG tablet 30 tablet 3     Sig: Take 1 tablet by mouth nightly as needed for Sleep      Name of Medication(s) Requested:  Requested Prescriptions     Pending Prescriptions Disp Refills    traZODone (DESYREL) 50 MG tablet 30 tablet 3     Sig: Take 1 tablet by mouth nightly as needed for Sleep       Medication is on current medication list Yes    Dosage and directions were verified? Yes    Quantity verified: 30 day supply     Pharmacy Verified?  Yes    Last Appointment:  11/20/2024    Future appts:  No future appointments.     (If no appt send self scheduling link. .REFILLAPPT)  Scheduling request sent?     [] Yes  [x] No    Does patient need updated?  [] Yes  [x] No

## 2025-07-09 DIAGNOSIS — F41.9 ANXIETY AND DEPRESSION: ICD-10-CM

## 2025-07-09 DIAGNOSIS — F32.A ANXIETY AND DEPRESSION: ICD-10-CM

## 2025-07-09 RX ORDER — BUPROPION HYDROCHLORIDE 200 MG/1
200 TABLET, EXTENDED RELEASE ORAL 2 TIMES DAILY
Qty: 180 TABLET | Refills: 0 | Status: SHIPPED | OUTPATIENT
Start: 2025-07-09

## 2025-09-04 DIAGNOSIS — R30.0 DYSURIA: Primary | ICD-10-CM

## 2025-09-04 LAB
BILIRUBIN, POC: NEGATIVE
BLOOD URINE, POC: ABNORMAL
CLARITY, POC: ABNORMAL
COLOR, POC: YELLOW
GLUCOSE URINE, POC: NEGATIVE MG/DL
KETONES, POC: NEGATIVE MG/DL
LEUKOCYTE EST, POC: ABNORMAL
NITRITE, POC: NEGATIVE
PH, POC: 6
PROTEIN, POC: 30 MG/DL
SPECIFIC GRAVITY, POC: >=1.03
UROBILINOGEN, POC: 0.2 MG/DL

## 2025-09-04 PROCEDURE — 81002 URINALYSIS NONAUTO W/O SCOPE: CPT | Performed by: NURSE PRACTITIONER

## 2025-09-05 DIAGNOSIS — R31.9 URINARY TRACT INFECTION WITH HEMATURIA, SITE UNSPECIFIED: Primary | ICD-10-CM

## 2025-09-05 DIAGNOSIS — N39.0 URINARY TRACT INFECTION WITH HEMATURIA, SITE UNSPECIFIED: Primary | ICD-10-CM

## 2025-09-05 RX ORDER — FLUCONAZOLE 150 MG/1
150 TABLET ORAL ONCE
Qty: 1 TABLET | Refills: 0 | Status: SHIPPED | OUTPATIENT
Start: 2025-09-05 | End: 2025-09-05

## 2025-09-05 RX ORDER — CEFDINIR 300 MG/1
300 CAPSULE ORAL 2 TIMES DAILY
Qty: 14 CAPSULE | Refills: 0 | Status: SHIPPED | OUTPATIENT
Start: 2025-09-05 | End: 2025-09-12

## 2025-09-06 LAB
CULTURE: ABNORMAL
SPECIMEN DESCRIPTION: ABNORMAL